# Patient Record
Sex: FEMALE | Race: WHITE | NOT HISPANIC OR LATINO | Employment: OTHER | URBAN - NONMETROPOLITAN AREA
[De-identification: names, ages, dates, MRNs, and addresses within clinical notes are randomized per-mention and may not be internally consistent; named-entity substitution may affect disease eponyms.]

---

## 2017-02-23 ENCOUNTER — OFFICE VISIT (OUTPATIENT)
Dept: MEDICAL GROUP | Facility: PHYSICIAN GROUP | Age: 62
End: 2017-02-23
Payer: COMMERCIAL

## 2017-02-23 VITALS
OXYGEN SATURATION: 97 % | BODY MASS INDEX: 29.09 KG/M2 | RESPIRATION RATE: 12 BRPM | DIASTOLIC BLOOD PRESSURE: 82 MMHG | SYSTOLIC BLOOD PRESSURE: 140 MMHG | TEMPERATURE: 98.8 F | HEIGHT: 66 IN | WEIGHT: 181 LBS | HEART RATE: 67 BPM

## 2017-02-23 DIAGNOSIS — E55.9 VITAMIN D DEFICIENCY: ICD-10-CM

## 2017-02-23 DIAGNOSIS — D68.9 CLOTTING DISORDER (HCC): ICD-10-CM

## 2017-02-23 DIAGNOSIS — D23.4 BLUE NEVUS OF SCALP: ICD-10-CM

## 2017-02-23 DIAGNOSIS — Z23 NEEDS FLU SHOT: ICD-10-CM

## 2017-02-23 DIAGNOSIS — E66.09 OBESITY DUE TO EXCESS CALORIES, UNSPECIFIED OBESITY SEVERITY: ICD-10-CM

## 2017-02-23 DIAGNOSIS — K21.00 GASTROESOPHAGEAL REFLUX DISEASE WITH ESOPHAGITIS: Chronic | ICD-10-CM

## 2017-02-23 DIAGNOSIS — F41.9 ANXIETY: ICD-10-CM

## 2017-02-23 PROCEDURE — 99214 OFFICE O/P EST MOD 30 MIN: CPT | Mod: 25 | Performed by: INTERNAL MEDICINE

## 2017-02-23 PROCEDURE — 90471 IMMUNIZATION ADMIN: CPT | Performed by: INTERNAL MEDICINE

## 2017-02-23 PROCEDURE — 90688 IIV4 VACCINE SPLT 0.5 ML IM: CPT | Performed by: INTERNAL MEDICINE

## 2017-02-23 RX ORDER — ESOMEPRAZOLE MAGNESIUM 40 MG/1
40 CAPSULE, DELAYED RELEASE ORAL
Qty: 90 CAP | Refills: 3 | Status: SHIPPED | OUTPATIENT
Start: 2017-02-23 | End: 2017-11-14

## 2017-02-23 RX ORDER — OMEPRAZOLE 20 MG/1
20 CAPSULE, DELAYED RELEASE ORAL DAILY
COMMUNITY
End: 2017-07-24

## 2017-02-23 ASSESSMENT — PAIN SCALES - GENERAL: PAINLEVEL: NO PAIN

## 2017-02-23 NOTE — ASSESSMENT & PLAN NOTE
Patient wonders if she is supposed to be taking asa.  She wonders if this has been present all her life.

## 2017-02-23 NOTE — MR AVS SNAPSHOT
"        Nell Fair   2017 11:40 AM   Office Visit   MRN: [id number]    Department:  [facility]   Dept Phone:  [phone]    Description:  Female : 1955   Provider:  Peyton NIELSON M.D.           Reason for Visit     Follow-Up stress       Allergies as of 2017     Allergen Noted Reactions    Ceclor [Cefaclor] 10/25/2011   Rash    Pcn [Penicillins] 10/25/2011   Rash    Protonix [Kdc:Ci Pigment Blue 63+Pantoprazole] 2016   Unspecified    dizziness    Sulfa Drugs 2014         You were diagnosed with     Anxiety   [696158]       Gastroesophageal reflux disease with esophagitis   [0434140]       Obesity due to excess calories, unspecified obesity severity   [8309802]       Vitamin D deficiency   [7449764]       Needs flu shot   [558020]       Blue nevus of scalp   [215315]       Clotting disorder (CMS-[facility])   [350983]         Vital Signs     Blood Pressure Pulse Temperature Respirations Height Weight    140/82 mmHg 67 37.1 °C (98.8 °F) 12 1.689 m (5' 6.5\") 82.101 kg (181 lb)    Body Mass Index Oxygen Saturation Smoking Status             28.78 kg/m2 97% Never Smoker          Basic Information     Date Of Birth Sex Race Ethnicity Preferred Language    1955 Female White Non- English      Problem List              ICD-10-CM Priority Class Noted - Resolved    GERD (gastroesophageal reflux disease) (Chronic) K21.9   2013 - Present    H/O radioactive iodine thyroid ablation (Chronic) Z92.3   2013 - Present    Obesity E66.9   2015 - Present    Vitamin D deficiency E55.9   2015 - Present    Clotting disorder (CMS-HCC) D68.9   2016 - Present    Blue nevus of scalp D22.4   2016 - Present    Anxiety F41.9   2016 - Present      Health Maintenance        Date Due Completion Dates    IMM DTaP/Tdap/Td Vaccine (1 - Tdap) 1974 ---    PAP SMEAR 1976 ---    IMM ZOSTER VACCINE 2015 ---    IMM INFLUENZA (1) 2016   " MAMMOGRAM 5/10/2017 5/10/2016 (Done)    Override on 5/10/2016: Done    COLONOSCOPY 3/1/2023 3/1/2013 (Done)    Override on 3/1/2013: Done            Current Immunizations     Influenza TIV (IM) 1/17/2014    Influenza Vaccine Quad Inj (Preserved)  Incomplete      Below and/or attached are the medications your provider expects you to take. Review all of your home medications and newly ordered medications with your provider and/or pharmacist. Follow medication instructions as directed by your provider and/or pharmacist. Please keep your medication list with you and share with your provider. Update the information when medications are discontinued, doses are changed, or new medications (including over-the-counter products) are added; and carry medication information at all times in the event of emergency situations     Allergies:  CECLOR - Rash     PCN - Rash     PROTONIX - Unspecified     SULFA DRUGS - (reactions not documented)               Medications  Valid as of: February 23, 2017 - 12:48 PM    Generic Name Brand Name Tablet Size Instructions for use    Aspirin (Tab) aspirin 81 MG Take 81 mg by mouth every day.        Calcium Citrate-Vitamin D   Take  by mouth.        Ergocalciferol (Cap) DRISDOL 77874 UNITS Take  by mouth every 7 days.        Esomeprazole Magnesium   Take  by mouth.        Esomeprazole Magnesium (CAPSULE DELAYED RELEASE) NEXIUM 40 MG Take 1 Cap by mouth every morning before breakfast.        Estradiol (Tab) VAGIFEM 10 MCG Insert 10 mcg in vagina every day.        LORazepam (Tab) ATIVAN 0.5 MG Take 1 Tab by mouth at bedtime as needed for Anxiety.        Nutritional Supplements   Take  by mouth.        Omeprazole (CAPSULE DELAYED RELEASE) PRILOSEC 20 MG Take 20 mg by mouth every day.        .                 Medicines prescribed today were sent to:     [facility] [place] - [address]    [address] [facility] [address]    Phone: [phone] Fax:  [phone]    Open 24 Hours?: No      Medication refill instructions:       If your prescription bottle indicates you have medication refills left, it is not necessary to call your provider’s office. Please contact your pharmacy and they will refill your medication.    If your prescription bottle indicates you do not have any refills left, you may request refills at any time through one of the following ways: The online Acision system (except Urgent Care), by calling your provider’s office, or by asking your pharmacy to contact your provider’s office with a refill request. Medication refills are processed only during regular business hours and may not be available until the next business day. Your provider may request additional information or to have a follow-up visit with you prior to refilling your medication.   *Please Note: Medication refills are assigned a new Rx number when refilled electronically. Your pharmacy may indicate that no refills were authorized even though a new prescription for the same medication is available at the pharmacy. Please request the medicine by name with the pharmacy before contacting your provider for a refill.           Acision Access Code: [email or web address]  Expires: 3/25/2017 11:26 AM    Acision  A secure, online tool to manage your health information     [employer]’s Acision® is a secure, online tool that connects you to your personalized health information from the privacy of your home -- day or night - making it very easy for you to manage your healthcare. Once the activation process is completed, you can even access your medical information using the Acision sara, which is available for free in the Apple Sara store or Google Play store.     Acision provides the following levels of access (as shown below):   My Chart Features   [facility] Doctor [facility]  Specialists [facility]  Urgent  Care Non-[facility]  Primary Care  Doctor   Email your healthcare team securely and  privately 24/7 X X X    Manage appointments: schedule your next appointment; view details of past/upcoming appointments X      Request prescription refills. X      View recent personal medical records, including lab and immunizations X X X X   View health record, including health history, allergies, medications X X X X   Read reports about your outpatient visits, procedures, consult and ER notes X X X X   See your discharge summary, which is a recap of your hospital and/or ER visit that includes your diagnosis, lab results, and care plan. X X       How to register for Purple Blue Bo:  1. Go to  [email or web address].  2. Click on the Sign Up Now box, which takes you to the New Member Sign Up page. You will need to provide the following information:  a. Enter your Purple Blue Bo Access Code exactly as it appears at the top of this page. (You will not need to use this code after you’ve completed the sign-up process. If you do not sign up before the expiration date, you must request a new code.)   b. Enter your date of birth.   c. Enter your home email address.   d. Click Submit, and follow the next screen’s instructions.  3. Create a Purple Blue Bo ID. This will be your Purple Blue Bo login ID and cannot be changed, so think of one that is secure and easy to remember.  4. Create a Purple Blue Bo password. You can change your password at any time.  5. Enter your Password Reset Question and Answer. This can be used at a later time if you forget your password.   6. Enter your e-mail address. This allows you to receive e-mail notifications when new information is available in Purple Blue Bo.  7. Click Sign Up. You can now view your health information.    For assistance activating your Purple Blue Bo account, call [phone]

## 2017-02-23 NOTE — ASSESSMENT & PLAN NOTE
continues with issues with cancer.  She is having gi symptoms.  She is using dotera oils, she ha used the lorazepam about 3 times.   with 6-9 mos life expectation.

## 2017-02-23 NOTE — ASSESSMENT & PLAN NOTE
Patient continues with gi symptoms, she went off the nexium for awhile, more stress at home, restarted Nexium and Carafate and symptoms are better.  $ issues with the

## 2017-02-27 NOTE — PROGRESS NOTES
Chief Complaint   Patient presents with   • Follow-Up     stress        HISTORY OF PRESENT ILLNESS: Patient is a 61 y.o. female established patient who presents today to discuss the medical issues below.    Anxiety   continues with issues with cancer.  She is having gi symptoms.  She is using dotera oils, she ha used the lorazepam about 3 times.   with 6-9 mos life expectation.      GERD (gastroesophageal reflux disease)  Patient continues with gi symptoms, she went off the nexium for awhile, more stress at home, restarted Nexium and Carafate and symptoms are better.  $ issues with the     Obesity  In no position to worry about diet at this time.      Vitamin D deficiency  On supplementation.      Clotting disorder (CMS-HCC)  Patient wonders if she is supposed to be taking asa.  She wonders if this has been present all her life.  She reports she has read about some danger to ASA however, has continued it currently.  No bruising, bloody nose, easy bleeding.       Patient Active Problem List    Diagnosis Date Noted   • Blue nevus of scalp 12/13/2016   • Anxiety 12/13/2016   • Clotting disorder (CMS-[facility]) 02/23/2016   • Obesity 11/20/2015   • Vitamin D deficiency 11/20/2015   • GERD (gastroesophageal reflux disease) 02/05/2013   • H/O radioactive iodine thyroid ablation 02/05/2013       Allergies:Ceclor; Pcn; Protonix; and Sulfa drugs    Current Outpatient Prescriptions   Medication Sig Dispense Refill   • omeprazole (PRILOSEC) 20 MG delayed-release capsule Take 20 mg by mouth every day.     • esomeprazole (NEXIUM) 40 MG delayed-release capsule Take 1 Cap by mouth every morning before breakfast. 90 Cap 3   • lorazepam (ATIVAN) 0.5 MG Tab Take 1 Tab by mouth at bedtime as needed for Anxiety. 20 Tab 0   • Calcium Carbonate-Vitamin D (CALCIUM + D PO) Take  by mouth.     • Nutritional Supplements (JUICE PLUS FIBRE PO) Take  by mouth.     • vitamin D, Ergocalciferol, (DRISDOL) 96891 UNITS Cap capsule Take  by  "mouth every 7 days.     • estradiol (VAGIFEM) 10 MCG Tab Insert 10 mcg in vagina every day.     • aspirin 81 MG tablet Take 81 mg by mouth every day.     • Esomeprazole Magnesium (NEXIUM PO) Take  by mouth.       No current facility-administered medications for this visit.         Past Medical History   Diagnosis Date   • Radiation      tx- thyroid   • GERD (gastroesophageal reflux disease) 2/5/2013   • H/O radioactive iodine thyroid ablation 2/5/2013   • Obesity 11/20/2015   • Vitamin D deficiency 11/20/2015   • Clotting disorder (CMS-HCC) 2/23/2016   • Anxiety 12/13/2016       Social History   Substance Use Topics   • Smoking status: Never Smoker    • Smokeless tobacco: Never Used   • Alcohol Use: 4.2 oz/week     7 Standard drinks or equivalent per week       Family Status   Relation Status Death Age   • Mother Alive    • Father Alive    • Brother Alive    • Daughter Alive      Family History   Problem Relation Age of Onset   • Diabetes Mother    • Other Mother      macular degeneration   • Thyroid Mother    • Psychiatry Mother      mood disorder   • Heart Disease Mother      pacemaker   • Osteoporosis Mother    • Hyperlipidemia Father    • Heart Disease Father      pacemaker   • Cancer Father      prostate   • Hyperlipidemia Brother    • Cancer Brother      prostate       ROS:   Respiratory: Negative for cough, sputum production, shortness of breath or wheezing.    Cardiovascular: Negative for chest pain, palpitations, orthopnea, dyspnea with exertion or edema.   Gastrointestinal: Negative for GI upset, nausea, vomiting, abdominal pain, constipation or diarrhea.   Genitourinary: Negative for dysuria, urgency, hesitancy or frequency.       Exam:   Blood pressure 140/82, pulse 67, temperature 37.1 °C (98.8 °F), resp. rate 12, height 1.689 m (5' 6.5\"), weight 82.101 kg (181 lb), SpO2 97 %.  General:  Well nourished, well developed female in NAD.  HENT: Normocephalic, bilateral TMs are intact, nasal and oral mucosa " with no lesions,   Neck: Supple without bruit. Thyroid is not enlarged.  Pulmonary: Clear to ausculation and percussion.  Normal effort. No rales, rhonchi, or wheezing.  Cardiovascular: Regular rate and rhythm without murmur.   Abdomen: Normal bowel sounds soft and nontender no palpable liver spleen bladder mass.  Extremities: No LE edema noted.  Neuro: Grossly nonfocal.  Psych: Alert and oriented to person, place, and time. Appropriate mood and conversation.        This dictation was created using voice recognition software. I have made reasonable attempts to correct errors, however, errors of grammar and content may exist.          Assessment/Plan:    1. Anxiety  Primary issue for today, long discussion and support given. Options for counseling, she declines, continues prn xanax with no evidence of abuse and appropriate reaction.     2. Gastroesophageal reflux disease with esophagitis  Stable on meds    3. Obesity due to excess calories, unspecified obesity severity  Support, high stress time, reviewed avoid carbs focus on protein for stress managemnt    4. Vitamin D deficiency  Stable on meds, lab montioring    5. Needs flu shot  - Flu Quad Inj>3 Years Preserved Multi-Dose Vial    6. Clotting disorder (CMS-[facility])  Recommend continue asa as she has documented clotting disorder, clarified for patient.       Patient was seen for 35 minutes face to face of which more than 50% of the time was spent in counseling and coordination of care regarding the above problems.

## 2017-07-24 ENCOUNTER — OFFICE VISIT (OUTPATIENT)
Dept: URGENT CARE | Facility: PHYSICIAN GROUP | Age: 62
End: 2017-07-24
Payer: COMMERCIAL

## 2017-07-24 VITALS
OXYGEN SATURATION: 96 % | TEMPERATURE: 99.2 F | RESPIRATION RATE: 16 BRPM | SYSTOLIC BLOOD PRESSURE: 142 MMHG | BODY MASS INDEX: 29.41 KG/M2 | WEIGHT: 183 LBS | DIASTOLIC BLOOD PRESSURE: 82 MMHG | HEART RATE: 82 BPM | HEIGHT: 66 IN

## 2017-07-24 DIAGNOSIS — J40 BRONCHITIS: ICD-10-CM

## 2017-07-24 PROCEDURE — 99214 OFFICE O/P EST MOD 30 MIN: CPT | Performed by: PHYSICIAN ASSISTANT

## 2017-07-24 RX ORDER — CODEINE PHOSPHATE AND GUAIFENESIN 10; 100 MG/5ML; MG/5ML
5 SOLUTION ORAL EVERY 6 HOURS PRN
Qty: 100 ML | Refills: 0 | Status: SHIPPED | OUTPATIENT
Start: 2017-07-24 | End: 2018-01-19

## 2017-07-24 RX ORDER — IPRATROPIUM BROMIDE AND ALBUTEROL SULFATE 2.5; .5 MG/3ML; MG/3ML
3 SOLUTION RESPIRATORY (INHALATION) ONCE
Status: COMPLETED | OUTPATIENT
Start: 2017-07-24 | End: 2017-07-24

## 2017-07-24 RX ORDER — CODEINE PHOSPHATE AND GUAIFENESIN 10; 100 MG/5ML; MG/5ML
5 SOLUTION ORAL EVERY 6 HOURS PRN
Qty: 100 ML | Refills: 0 | Status: SHIPPED | OUTPATIENT
Start: 2017-07-24 | End: 2017-07-24 | Stop reason: SDUPTHER

## 2017-07-24 RX ORDER — ALBUTEROL SULFATE 90 UG/1
2 AEROSOL, METERED RESPIRATORY (INHALATION) EVERY 6 HOURS PRN
Qty: 8.5 G | Refills: 1 | Status: SHIPPED | OUTPATIENT
Start: 2017-07-24 | End: 2018-01-19

## 2017-07-24 RX ADMIN — IPRATROPIUM BROMIDE AND ALBUTEROL SULFATE 3 ML: 2.5; .5 SOLUTION RESPIRATORY (INHALATION) at 15:23

## 2017-07-24 ASSESSMENT — ENCOUNTER SYMPTOMS
SPUTUM PRODUCTION: 0
COUGH: 1
SHORTNESS OF BREATH: 1
RHINORRHEA: 1
HEMOPTYSIS: 0
CHILLS: 0
SORE THROAT: 1
WHEEZING: 0
FEVER: 0

## 2017-07-24 NOTE — MR AVS SNAPSHOT
"        Nell Fair   2017 3:05 PM   Office Visit   MRN: [id number]    Department:  [facility]   Dept Phone:  [phone]    Description:  Female : 1955   Provider:  SOFIA Chavez           Reason for Visit     Cough x5days chest tightness      Allergies as of 2017     Allergen Noted Reactions    Ceclor [Cefaclor] 10/25/2011   Rash    Pcn [Penicillins] 10/25/2011   Rash    Protonix [Kdc:Ci Pigment Blue 63+Pantoprazole] 2016   Unspecified    dizziness    Sulfa Drugs 2014         You were diagnosed with     Bronchitis   [[id number]]         Vital Signs     Blood Pressure Pulse Temperature Respirations Height Weight    142/82 mmHg 82 37.3 °C (99.2 °F) 16 1.689 m (5' 6.5\") 83.008 kg (183 lb)    Body Mass Index Oxygen Saturation Breastfeeding? Smoking Status          29.10 kg/m2 96% No Never Smoker         Basic Information     Date Of Birth Sex Race Ethnicity Preferred Language    1955 Female White Non- English      Your appointments     Sep 13, 2017  9:00 AM   Established Patient with Peyton NIELSON M.D.   [facility] (--)    [address] [address] [phone]           You will be receiving a confirmation call a few days before your appointment from our automated call confirmation system.              Problem List              ICD-10-CM Priority Class Noted - Resolved    GERD (gastroesophageal reflux disease) (Chronic) K21.9   2013 - Present    H/O radioactive iodine thyroid ablation (Chronic) Z92.3   2013 - Present    Obesity E66.9   2015 - Present    Vitamin D deficiency E55.9   2015 - Present    Clotting disorder (CMS-HCC) D68.9   2016 - Present    Blue nevus of scalp D22.4   2016 - Present    Anxiety F41.9   2016 - Present      Health Maintenance        Date Due Completion Dates    IMM DTaP/Tdap/Td Vaccine (1 - Tdap) 1974 ---    PAP SMEAR 1976 ---    IMM ZOSTER " VACCINE 7/27/2015 ---    MAMMOGRAM 5/10/2017 5/10/2016 (Done)    Override on 5/10/2016: Done    IMM INFLUENZA (1) 9/1/2017 2/23/2017, 1/17/2014    COLONOSCOPY 3/1/2023 3/1/2013 (Done)    Override on 3/1/2013: Done            Current Immunizations     Influenza TIV (IM) 1/17/2014    Influenza Vaccine Quad Inj (Preserved) 2/23/2017      Below and/or attached are the medications your provider expects you to take. Review all of your home medications and newly ordered medications with your provider and/or pharmacist. Follow medication instructions as directed by your provider and/or pharmacist. Please keep your medication list with you and share with your provider. Update the information when medications are discontinued, doses are changed, or new medications (including over-the-counter products) are added; and carry medication information at all times in the event of emergency situations     Allergies:  CECLOR - Rash     PCN - Rash     PROTONIX - Unspecified     SULFA DRUGS - (reactions not documented)               Medications  Valid as of: July 24, 2017 -  3:32 PM    Generic Name Brand Name Tablet Size Instructions for use    Albuterol Sulfate (Aero Soln) albuterol 108 (90 BASE) MCG/ACT Inhale 2 Puffs by mouth every 6 hours as needed for Shortness of Breath.        Aspirin (Tab) aspirin 81 MG Take 81 mg by mouth every day.        Calcium Citrate-Vitamin D   Take  by mouth.        Ergocalciferol (Cap) DRISDOL 94405 UNITS Take  by mouth every 7 days.        Esomeprazole Magnesium (CAPSULE DELAYED RELEASE) NEXIUM 40 MG Take 1 Cap by mouth every morning before breakfast.        Estradiol (Tab) VAGIFEM 10 MCG Insert 10 mcg in vagina every day.        Guaifenesin-Codeine (Solution) ROBITUSSIN -10 mg/5mL Take 5 mL by mouth every 6 hours as needed for Cough.        Nutritional Supplements   Take  by mouth.        .                 Medicines prescribed today were sent to:     [facility] [place] - [address]    [address] [place] [address]    Phone: [phone] Fax: [phone]    Open 24 Hours?: No      Medication refill instructions:       If your prescription bottle indicates you have medication refills left, it is not necessary to call your provider’s office. Please contact your pharmacy and they will refill your medication.    If your prescription bottle indicates you do not have any refills left, you may request refills at any time through one of the following ways: The online [employer] system (except Urgent Care), by calling your provider’s office, or by asking your pharmacy to contact your provider’s office with a refill request. Medication refills are processed only during regular business hours and may not be available until the next business day. Your provider may request additional information or to have a follow-up visit with you prior to refilling your medication.   *Please Note: Medication refills are assigned a new Rx number when refilled electronically. Your pharmacy may indicate that no refills were authorized even though a new prescription for the same medication is available at the pharmacy. Please request the medicine by name with the pharmacy before contacting your provider for a refill.        Instructions    Bronchitis  Bronchitis is the body's way of reacting to injury and/or infection (inflammation) of the bronchi. Bronchi are the air tubes that extend from the windpipe into the lungs. If the inflammation becomes severe, it may cause shortness of breath.  CAUSES   Inflammation may be caused by:  · A virus.  · Germs (bacteria).  · Dust.  · Allergens.  · Pollutants and many other irritants.  The cells lining the bronchial tree are covered with tiny hairs (cilia). These constantly beat upward, away from the lungs, toward the mouth. This keeps the lungs free of pollutants. When these cells become too irritated and are unable to do their job, mucus begins to  develop. This causes the characteristic cough of bronchitis. The cough clears the lungs when the cilia are unable to do their job. Without either of these protective mechanisms, the mucus would settle in the lungs. Then you would develop pneumonia.  Smoking is a common cause of bronchitis and can contribute to pneumonia. Stopping this habit is the single most important thing you can do to help yourself.  TREATMENT   · Your caregiver may prescribe an antibiotic if the cough is caused by bacteria. Also, medicines that open up your airways make it easier to breathe. Your caregiver may also recommend or prescribe an expectorant. It will loosen the mucus to be coughed up. Only take over-the-counter or prescription medicines for pain, discomfort, or fever as directed by your caregiver.  · Removing whatever causes the problem (smoking, for example) is critical to preventing the problem from getting worse.  · Cough suppressants may be prescribed for relief of cough symptoms.  · Inhaled medicines may be prescribed to help with symptoms now and to help prevent problems from returning.  · For those with recurrent (chronic) bronchitis, there may be a need for steroid medicines.  SEEK IMMEDIATE MEDICAL CARE IF:   · During treatment, you develop more pus-like mucus (purulent sputum).  · You have a fever.  · Your baby is older than 3 months with a rectal temperature of 102° F (38.9° C) or higher.  · Your baby is 3 months old or younger with a rectal temperature of 100.4° F (38° C) or higher.  · You become progressively more ill.  · You have increased difficulty breathing, wheezing, or shortness of breath.  It is necessary to seek immediate medical care if you are elderly or sick from any other disease.  MAKE SURE YOU:   · Understand these instructions.  · Will watch your condition.  · Will get help right away if you are not doing well or get worse.  Document Released: 12/18/2006 Document Revised: 03/11/2013 Document Reviewed:  10/27/2009  ExitCare® Patient Information ©2014 [employer].            [employer] Access Code: [email or web address]  Expires: 8/23/2017  3:32 PM    [employer]  A secure, online tool to manage your health information     [employer]’s [employer]® is a secure, online tool that connects you to your personalized health information from the privacy of your home -- day or night - making it very easy for you to manage your healthcare. Once the activation process is completed, you can even access your medical information using the [employer] sara, which is available for free in the Apple Sara store or Google Play store.     [employer] provides the following levels of access (as shown below):   My Chart Features   [facility] Doctor [facility]  Specialists [facility]  Urgent  Care Non-[facility]  Primary Care  Doctor   Email your healthcare team securely and privately 24/7 X X X    Manage appointments: schedule your next appointment; view details of past/upcoming appointments X      Request prescription refills. X      View recent personal medical records, including lab and immunizations X X X X   View health record, including health history, allergies, medications X X X X   Read reports about your outpatient visits, procedures, consult and ER notes X X X X   See your discharge summary, which is a recap of your hospital and/or ER visit that includes your diagnosis, lab results, and care plan. X X       How to register for [employer]:  1. Go to  [email or web address].  2. Click on the Sign Up Now box, which takes you to the New Member Sign Up page. You will need to provide the following information:  a. Enter your [employer] Access Code exactly as it appears at the top of this page. (You will not need to use this code after you’ve completed the sign-up process. If you do not sign up before the expiration date, you must request a new code.)   b. Enter your date of birth.   c. Enter your home email address.   d. Click Submit, and follow the next  screen’s instructions.  3. Create a Play[employer] ID. This will be your Play[employer] login ID and cannot be changed, so think of one that is secure and easy to remember.  4. Create a [employer] password. You can change your password at any time.  5. Enter your Password Reset Question and Answer. This can be used at a later time if you forget your password.   6. Enter your e-mail address. This allows you to receive e-mail notifications when new information is available in [employer].  7. Click Sign Up. You can now view your health information.    For assistance activating your [employer] account, call [phone]

## 2017-07-24 NOTE — PATIENT INSTRUCTIONS

## 2017-07-24 NOTE — PROGRESS NOTES
Subjective:      Nell Fair is a 61 y.o. female who presents with Cough            HPI Comments: Patient presents today with 5 day history of cough and chest tightness with occasional shortness of breath. Symptoms seem to be worse at night and when moving around. Reports significant difficulty sleeping last night due to very frequent coughing. She would like a cough suppressant as the OTC cough medicine does not seem to be helping very much. No measured fevers. No sputum.    Cough  This is a new problem. The current episode started in the past 7 days. The problem has been waxing and waning. The cough is non-productive. Associated symptoms include nasal congestion, rhinorrhea, a sore throat and shortness of breath. Pertinent negatives include no chills, fever, hemoptysis or wheezing. Exacerbated by: activity. She has tried OTC cough suppressant for the symptoms. The treatment provided mild relief.       Review of Systems   Constitutional: Negative for fever and chills.   HENT: Positive for congestion, rhinorrhea and sore throat.    Respiratory: Positive for cough and shortness of breath. Negative for hemoptysis, sputum production and wheezing.      Allergies:Ceclor; Pcn; Protonix; and Sulfa drugs    Current Outpatient Prescriptions Ordered in [facility]   Medication Sig Dispense Refill   • guaifenesin-codeine (ROBITUSSIN AC) Solution oral solution Take 5 mL by mouth every 6 hours as needed for Cough. 100 mL 0   • albuterol 108 (90 BASE) MCG/ACT Aero Soln inhalation aerosol Inhale 2 Puffs by mouth every 6 hours as needed for Shortness of Breath. 8.5 g 1   • esomeprazole (NEXIUM) 40 MG delayed-release capsule Take 1 Cap by mouth every morning before breakfast. 90 Cap 3   • Calcium Carbonate-Vitamin D (CALCIUM + D PO) Take  by mouth.     • Nutritional Supplements (JUICE PLUS FIBRE PO) Take  by mouth.     • vitamin D, Ergocalciferol, (DRISDOL) 69296 UNITS Cap capsule Take  by mouth every 7 days.     • estradiol  "(VAGIFEM) 10 MCG Tab Insert 10 mcg in vagina every day.     • aspirin 81 MG tablet Take 81 mg by mouth every day.       No current [facility]-ordered facility-administered medications on file.       Past Medical History   Diagnosis Date   • Radiation      tx- thyroid   • GERD (gastroesophageal reflux disease) 2/5/2013   • H/O radioactive iodine thyroid ablation 2/5/2013   • Obesity 11/20/2015   • Vitamin D deficiency 11/20/2015   • Clotting disorder (CMS-HCC) 2/23/2016   • Anxiety 12/13/2016       Social History   Substance Use Topics   • Smoking status: Never Smoker    • Smokeless tobacco: Never Used   • Alcohol Use: 4.2 oz/week     7 Standard drinks or equivalent per week      Comment: soc       Family Status   Relation Status Death Age   • Mother Alive    • Father Alive    • Brother Alive    • Daughter Alive      Family History   Problem Relation Age of Onset   • Diabetes Mother    • Other Mother      macular degeneration   • Thyroid Mother    • Psychiatry Mother      mood disorder   • Heart Disease Mother      pacemaker   • Osteoporosis Mother    • Hyperlipidemia Father    • Heart Disease Father      pacemaker   • Cancer Father      prostate   • Hyperlipidemia Brother    • Cancer Brother      prostate         Objective:     /82 mmHg  Pulse 82  Temp(Src) 37.3 °C (99.2 °F)  Resp 16  Ht 1.689 m (5' 6.5\")  Wt 83.008 kg (183 lb)  BMI 29.10 kg/m2  SpO2 96%  Breastfeeding? No     Physical Exam   Constitutional: She is oriented to person, place, and time. She appears well-developed and well-nourished. No distress.   HENT:   Head: Normocephalic and atraumatic.   Right Ear: External ear normal.   Left Ear: External ear normal.   Mouth/Throat: Oropharynx is clear and moist.   Eyes: Right eye exhibits no discharge. Left eye exhibits no discharge.   Neck: Normal range of motion. Neck supple.   Cardiovascular: Normal rate and regular rhythm.    Pulmonary/Chest: Effort normal and breath sounds normal. She has no " wheezes. She has no rales.   Neurological: She is alert and oriented to person, place, and time.   Skin: Skin is warm and dry. She is not diaphoretic.   Psychiatric: She has a normal mood and affect. Her behavior is normal. Judgment and thought content normal.   Nursing note and vitals reviewed.              Assessment/Plan:     1. Bronchitis  ipratropium-albuterol (DUONEB) nebulizer solution 3 mL    guaifenesin-codeine (ROBITUSSIN AC) Solution oral solution    albuterol 108 (90 BASE) MCG/ACT Aero Soln inhalation aerosol    DISCONTINUED: guaifenesin-codeine (ROBITUSSIN AC) Solution oral solution    Fluctuating for 5 days. No sputum. Improvement in chest tightness after DuoNeb. Start albuterol and Robitussin-AC. Follow-up with PCP       [employer] Patient Education given: Bronchitis    Please note that this dictation was created using voice recognition software. I have made every reasonable attempt to correct obvious errors, but I expect that there are errors of grammar and possibly content that I did not discover before finalizing the note.

## 2017-09-13 ENCOUNTER — OFFICE VISIT (OUTPATIENT)
Dept: MEDICAL GROUP | Facility: PHYSICIAN GROUP | Age: 62
End: 2017-09-13
Payer: COMMERCIAL

## 2017-09-13 VITALS
BODY MASS INDEX: 30.32 KG/M2 | DIASTOLIC BLOOD PRESSURE: 90 MMHG | RESPIRATION RATE: 16 BRPM | HEIGHT: 65 IN | SYSTOLIC BLOOD PRESSURE: 130 MMHG | WEIGHT: 182 LBS | HEART RATE: 68 BPM | OXYGEN SATURATION: 99 % | TEMPERATURE: 97.9 F

## 2017-09-13 DIAGNOSIS — F41.9 ANXIETY: ICD-10-CM

## 2017-09-13 DIAGNOSIS — E66.09 OBESITY DUE TO EXCESS CALORIES, UNSPECIFIED OBESITY SEVERITY: ICD-10-CM

## 2017-09-13 DIAGNOSIS — E55.9 VITAMIN D DEFICIENCY: ICD-10-CM

## 2017-09-13 DIAGNOSIS — K21.00 GASTROESOPHAGEAL REFLUX DISEASE WITH ESOPHAGITIS: Chronic | ICD-10-CM

## 2017-09-13 PROCEDURE — 99214 OFFICE O/P EST MOD 30 MIN: CPT | Performed by: INTERNAL MEDICINE

## 2017-09-13 RX ORDER — SUCRALFATE 1 G/1
1 TABLET ORAL
Qty: 120 TAB | Refills: 3 | Status: SHIPPED | OUTPATIENT
Start: 2017-09-13 | End: 2019-03-27

## 2017-09-13 RX ORDER — OMEPRAZOLE 20 MG/1
20 CAPSULE, DELAYED RELEASE ORAL DAILY
Qty: 90 CAP | Refills: 3 | Status: SHIPPED | OUTPATIENT
Start: 2017-09-13 | End: 2017-11-14

## 2017-09-13 ASSESSMENT — PAIN SCALES - GENERAL: PAINLEVEL: NO PAIN

## 2017-09-13 NOTE — ASSESSMENT & PLAN NOTE
Patient reports  did pass away.  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    about 2 mos ago.

## 2017-09-14 NOTE — PROGRESS NOTES
Chief Complaint   Patient presents with   • Follow-Up     stress        HISTORY OF PRESENT ILLNESS: Patient is a 62 y.o. female established patient who presents today to discuss the medical issues below.    Anxiety  Patient reports  did pass away.  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    about 2 mos ago.  Weight is good she sleeping well, starting to exercise.      Patient Active Problem List    Diagnosis Date Noted   • Blue nevus of scalp 2016   • Anxiety 2016   • Clotting disorder (CMS-HCC) 2016   • Obesity 2015   • Vitamin D deficiency 2015   • GERD (gastroesophageal reflux disease) 2013   • H/O radioactive iodine thyroid ablation 2013       Allergies:Ceclor [cefaclor]; Pcn [penicillins]; Protonix [kdc:ci pigment blue 63+pantoprazole]; and Sulfa drugs    Current Outpatient Prescriptions   Medication Sig Dispense Refill   • sucralfate (CARAFATE) 1 GM Tab Take 1 Tab by mouth 4 Times a Day,Before Meals and at Bedtime. 120 Tab 3   • omeprazole (PRILOSEC) 20 MG delayed-release capsule Take 1 Cap by mouth every day. 90 Cap 3   • esomeprazole (NEXIUM) 40 MG delayed-release capsule Take 1 Cap by mouth every morning before breakfast. 90 Cap 3   • Calcium Carbonate-Vitamin D (CALCIUM + D PO) Take  by mouth.     • Nutritional Supplements (JUICE PLUS FIBRE PO) Take  by mouth.     • vitamin D, Ergocalciferol, (DRISDOL) 95801 UNITS Cap capsule Take 4,000 Units by mouth every 7 days.     • estradiol (VAGIFEM) 10 MCG Tab Insert 10 mcg in vagina every day.     • aspirin 81 MG tablet Take 81 mg by mouth every day.     • guaifenesin-codeine (ROBITUSSIN AC) Solution oral solution Take 5 mL by mouth every 6 hours as needed for Cough. (Patient not taking: Reported on 2017) 100 mL 0   • albuterol 108 (90 BASE) MCG/ACT Aero Soln inhalation aerosol Inhale 2 Puffs by mouth every 6 hours as needed for Shortness of Breath. (Patient not taking: Reported on  "9/13/2017) 8.5 g 1     No current facility-administered medications for this visit.          Past Medical History:   Diagnosis Date   • Anxiety 12/13/2016   • Clotting disorder (CMS-HCC) 2/23/2016   • Obesity 11/20/2015   • Vitamin D deficiency 11/20/2015   • GERD (gastroesophageal reflux disease) 2/5/2013   • H/O radioactive iodine thyroid ablation 2/5/2013   • Radiation     tx- thyroid       Social History   Substance Use Topics   • Smoking status: Never Smoker   • Smokeless tobacco: Never Used   • Alcohol use 4.2 oz/week     7 Standard drinks or equivalent per week      Comment: soc       Family Status   Relation Status   • Mother Alive   • Father Alive   • Brother Alive   • Daughter Alive     Family History   Problem Relation Age of Onset   • Diabetes Mother    • Other Mother      macular degeneration   • Thyroid Mother    • Psychiatry Mother      mood disorder   • Heart Disease Mother      pacemaker   • Osteoporosis Mother    • Hyperlipidemia Father    • Heart Disease Father      pacemaker   • Cancer Father      prostate   • Hyperlipidemia Brother    • Cancer Brother      prostate       ROS:    Respiratory: Negative for cough, sputum production, shortness of breath or wheezing.    Cardiovascular: Negative for chest pain, palpitations, orthopnea, dyspnea with exertion or edema.   Gastrointestinal: Negative for GI upset, nausea, vomiting, abdominal pain, constipation or diarrhea.   Genitourinary: Negative for dysuria, urgency, hesitancy or frequency.       Exam:    Blood pressure 130/90, pulse 68, temperature 36.6 °C (97.9 °F), resp. rate 16, height 1.651 m (5' 5\"), weight 82.6 kg (182 lb), SpO2 99 %.  General:  Well nourished, well developed female in NAD.  HENT: Normocephalic, bilateral TMs are intact, nasal and oral mucosa with no lesions,   Neck: Supple without bruit. Thyroid is not enlarged.  Pulmonary: Clear to ausculation and percussion.  Normal effort. No rales, rhonchi, or wheezing.  Cardiovascular: " Regular rate and rhythm without murmur.   Abdomen: Normal bowel sounds soft and nontender no palpable liver spleen bladder mass.  Extremities: No LE edema noted.  Neuro: Grossly nonfocal.  Psych: Alert and oriented to person, place, and time. Appropriate mood and conversation.        This dictation was created using voice recognition software. I have made reasonable attempts to correct errors, however, errors of grammar and content may exist.          Assessment/Plan:    1. Anxiety  Doing well discussed options support clinical counseling referral made  - REFERRAL TO BEHAVIORAL HEALTH    2. Gastroesophageal reflux disease with esophagitis  Patient needs a refill on her Carafate she is doing well with only when necessary use ongoing monitoring consideration for repeat endoscopy if persisting  - sucralfate (CARAFATE) 1 GM Tab; Take 1 Tab by mouth 4 Times a Day,Before Meals and at Bedtime.  Dispense: 120 Tab; Refill: 3  - COMP METABOLIC PANEL; Future  - CBC WITH DIFFERENTIAL; Future    3. Vitamin D deficiency  Discussed vitamin D monitoring  - VITAMIN D,25 HYDROXY; Future    4. Obesity due to excess calories, unspecified obesity severity  Discussed diet exercise weight loss ongoing lab monitoring  - LIPID PROFILE; Future       Patient was seen for  25 minutes face to face of which more than 50% of the time was spent in counseling and coordination of care regarding the above problems.

## 2017-10-09 NOTE — TELEPHONE ENCOUNTER
Was the patient seen in the last year in this department? Yes  Last OV 09/13/2017 Next OV 12/28/2017    Does patient have an active prescription for medications requested? Yes     Received Request Via: Pharmacy     Patient was put on esomeprazole and she has been taking it for 3 weeks and it is not working for her like the [facility] did. She would like to go back to the [facility].

## 2017-10-10 RX ORDER — OMEPRAZOLE 20 MG/1
40 CAPSULE, DELAYED RELEASE ORAL DAILY
Qty: 90 CAP | OUTPATIENT
Start: 2017-10-10

## 2017-11-14 ENCOUNTER — TELEPHONE (OUTPATIENT)
Dept: MEDICAL GROUP | Facility: PHYSICIAN GROUP | Age: 62
End: 2017-11-14

## 2017-11-14 RX ORDER — OMEPRAZOLE 40 MG/1
40 CAPSULE, DELAYED RELEASE ORAL DAILY
Qty: 90 CAP | Refills: 0 | Status: SHIPPED | OUTPATIENT
Start: 2017-11-14 | End: 2018-02-07 | Stop reason: SDUPTHER

## 2017-11-14 NOTE — TELEPHONE ENCOUNTER
1. Caller Name: paulina                      Call Back Number: [phone]    2. Message: pt called and stated that she no longer wants to use esomeprazole because it is more expensive than omeprazole.  She wants to go with omeprazole 40 mgs.  An Rx was sent for omeprazole 20 mgs in September, but the patient said that she has always taken 40 mgs.  Can the RX be changed?  Please advise     3. Patient approves office to leave a detailed voicemail/MyChart message: N\A

## 2018-01-19 ENCOUNTER — OFFICE VISIT (OUTPATIENT)
Dept: MEDICAL GROUP | Facility: PHYSICIAN GROUP | Age: 63
End: 2018-01-19
Payer: OTHER MISCELLANEOUS

## 2018-01-19 VITALS
HEIGHT: 65 IN | WEIGHT: 189 LBS | DIASTOLIC BLOOD PRESSURE: 82 MMHG | RESPIRATION RATE: 16 BRPM | HEART RATE: 72 BPM | BODY MASS INDEX: 31.49 KG/M2 | SYSTOLIC BLOOD PRESSURE: 132 MMHG | TEMPERATURE: 98.9 F | OXYGEN SATURATION: 99 %

## 2018-01-19 DIAGNOSIS — E78.5 HYPERLIPIDEMIA, UNSPECIFIED HYPERLIPIDEMIA TYPE: ICD-10-CM

## 2018-01-19 DIAGNOSIS — F41.9 ANXIETY: ICD-10-CM

## 2018-01-19 DIAGNOSIS — Z23 NEEDS FLU SHOT: ICD-10-CM

## 2018-01-19 DIAGNOSIS — E55.9 VITAMIN D DEFICIENCY: ICD-10-CM

## 2018-01-19 DIAGNOSIS — Z92.3 H/O RADIOACTIVE IODINE THYROID ABLATION: Chronic | ICD-10-CM

## 2018-01-19 DIAGNOSIS — E66.09 CLASS 1 OBESITY DUE TO EXCESS CALORIES WITHOUT SERIOUS COMORBIDITY WITH BODY MASS INDEX (BMI) OF 30.0 TO 30.9 IN ADULT: ICD-10-CM

## 2018-01-19 PROCEDURE — 90471 IMMUNIZATION ADMIN: CPT | Performed by: INTERNAL MEDICINE

## 2018-01-19 PROCEDURE — 99214 OFFICE O/P EST MOD 30 MIN: CPT | Mod: 25 | Performed by: INTERNAL MEDICINE

## 2018-01-19 PROCEDURE — 90686 IIV4 VACC NO PRSV 0.5 ML IM: CPT | Performed by: INTERNAL MEDICINE

## 2018-01-19 ASSESSMENT — PAIN SCALES - GENERAL: PAINLEVEL: NO PAIN

## 2018-01-19 NOTE — PROGRESS NOTES
Chief Complaint   Patient presents with   • Vitamin D Deficiency     lab results        HISTORY OF PRESENT ILLNESS: Patient is a 62 y.o. female established patient who presents today to discuss the medical issues below.    H/O radioactive iodine thyroid ablation  Feels good, labs did not include the thyroid.  Weigh is up a bit.      Anxiety  Doing well, increasing social activities.      Vitamin D deficiency  Patient on vit d supplement and unfortunately did not have done with current labs.      Obesity  Weight is up a bit. She's planning on joining [employer] with her friends.      Patient Active Problem List    Diagnosis Date Noted   • Blue nevus of scalp 12/13/2016   • Anxiety 12/13/2016   • Clotting disorder (CMS-[facility]) 02/23/2016   • Obesity 11/20/2015   • Vitamin D deficiency 11/20/2015   • GERD (gastroesophageal reflux disease) 02/05/2013   • H/O radioactive iodine thyroid ablation 02/05/2013       Allergies:Ceclor [cefaclor]; Pcn [penicillins]; Protonix [kdc:ci pigment blue 63+pantoprazole]; and Sulfa drugs    Current Outpatient Prescriptions   Medication Sig Dispense Refill   • omeprazole (PRILOSEC) 40 MG delayed-release capsule Take 1 Cap by mouth every day. 90 Cap 0   • Calcium Carbonate-Vitamin D (CALCIUM + D PO) Take  by mouth.     • Nutritional Supplements (JUICE PLUS FIBRE PO) Take  by mouth.     • vitamin D, Ergocalciferol, (DRISDOL) 98494 UNITS Cap capsule Take 4,000 Units by mouth every 7 days.     • estradiol (VAGIFEM) 10 MCG Tab Insert 10 mcg in vagina every day.     • aspirin 81 MG tablet Take 81 mg by mouth every day.     • sucralfate (CARAFATE) 1 GM Tab Take 1 Tab by mouth 4 Times a Day,Before Meals and at Bedtime. 120 Tab 3     No current facility-administered medications for this visit.          Past Medical History:   Diagnosis Date   • Anxiety 12/13/2016   • Clotting disorder (CMS-[facility]) 2/23/2016   • GERD (gastroesophageal reflux disease) 2/5/2013   • H/O radioactive iodine thyroid  "ablation 2/5/2013   • Obesity 11/20/2015   • Radiation     tx- thyroid   • Vitamin D deficiency 11/20/2015       Social History   Substance Use Topics   • Smoking status: Never Smoker   • Smokeless tobacco: Never Used   • Alcohol use 4.2 oz/week     7 Standard drinks or equivalent per week      Comment: soc       Family Status   Relation Status   • Mother Alive   • Father Alive   • Brother Alive   • Daughter Alive     Family History   Problem Relation Age of Onset   • Diabetes Mother    • Other Mother      macular degeneration   • Thyroid Mother    • Psychiatry Mother      mood disorder   • Heart Disease Mother      pacemaker   • Osteoporosis Mother    • Hyperlipidemia Father    • Heart Disease Father      pacemaker   • Cancer Father      prostate   • Hyperlipidemia Brother    • Cancer Brother      prostate       ROS:    Respiratory: Negative for cough, sputum production, shortness of breath or wheezing.    Cardiovascular: Negative for chest pain, palpitations, orthopnea, dyspnea with exertion or edema.   Gastrointestinal: Negative for GI upset, nausea, vomiting, abdominal pain, constipation or diarrhea.   Genitourinary: Negative for dysuria, urgency, hesitancy or frequency.       Exam:    Blood pressure 132/82, pulse 72, temperature 37.2 °C (98.9 °F), resp. rate 16, height 1.651 m (5' 5\"), weight 85.7 kg (189 lb), SpO2 99 %.  General:  Well nourished, well developed female in NAD.  HENT: Normocephalic, bilateral TMs are intact, nasal and oral mucosa with no lesions,   Neck: Supple without bruit. Thyroid is not enlarged.  Pulmonary: Clear to ausculation and percussion.  Normal effort. No rales, rhonchi, or wheezing.  Cardiovascular: Regular rate and rhythm without murmur.   Abdomen: Normal bowel sounds soft and nontender no palpable liver spleen bladder mass.  Extremities: No LE edema noted.  Neuro: Grossly nonfocal.  Psych: Alert and oriented to person, place, and time. Appropriate mood and conversation.    LABS: " Results reviewed and discussed with the patient, questions answered.      This dictation was created using voice recognition software. I have made reasonable attempts to correct errors, however, errors of grammar and content may exist.          Assessment/Plan:    1. H/O radioactive iodine thyroid ablation  Clinically euthyroid will add TSH the next draw at patient request  - TSH; Future    2. Anxiety  Long discussion held she is doing well with bereavement continues to support    3. Vitamin D deficiency  On vitamin D supplementation will add this lab to the next blood draw  - VITAMIN 1,25 DIHYDROXY; Future    4. Needs flu shot    - INFLUENZA VACCINE QUAD INJ >3Y(PF)    5. Hyperlipidemia, unspecified hyperlipidemia type  LDL is up in the 150 range, super high HDL somewhat protective however discussed diet exercise weight loss  - LIPID PROFILE; Future    6. Class 1 obesity due to excess calories without serious comorbidity with body mass index (BMI) of 30.0 to 30.9 in adult  Discussed diet, exercise, weight loss, behavioral modification, portion size management.      Patient was seen for  25 minutes face to face of which more than 50% of the time was spent in counseling and coordination of care regarding the above problems.

## 2018-02-09 RX ORDER — OMEPRAZOLE 40 MG/1
CAPSULE, DELAYED RELEASE ORAL
Qty: 90 CAP | Refills: 1 | Status: SHIPPED | OUTPATIENT
Start: 2018-02-09 | End: 2018-07-30 | Stop reason: SDUPTHER

## 2018-02-09 NOTE — TELEPHONE ENCOUNTER
Was the patient seen in the last year in this department? Yes     Does patient have an active prescription for medications requested? No     Received Request Via: Pharmacy      Pt met protocol?: Yes pt last ov 1/2018

## 2018-03-12 ENCOUNTER — APPOINTMENT (OUTPATIENT)
Dept: RADIOLOGY | Facility: IMAGING CENTER | Age: 63
End: 2018-03-12
Attending: CHIROPRACTOR
Payer: OTHER MISCELLANEOUS

## 2018-03-12 ENCOUNTER — NON-PROVIDER VISIT (OUTPATIENT)
Dept: URGENT CARE | Facility: PHYSICIAN GROUP | Age: 63
End: 2018-03-12

## 2018-03-12 DIAGNOSIS — M54.2 NECK PAIN: ICD-10-CM

## 2018-03-12 PROCEDURE — 72050 X-RAY EXAM NECK SPINE 4/5VWS: CPT | Mod: TC,FY | Performed by: FAMILY MEDICINE

## 2018-09-26 DIAGNOSIS — E55.9 VITAMIN D DEFICIENCY: ICD-10-CM

## 2018-09-27 ENCOUNTER — OFFICE VISIT (OUTPATIENT)
Dept: MEDICAL GROUP | Facility: PHYSICIAN GROUP | Age: 63
End: 2018-09-27
Payer: OTHER MISCELLANEOUS

## 2018-09-27 VITALS
HEART RATE: 64 BPM | BODY MASS INDEX: 30.89 KG/M2 | WEIGHT: 185.4 LBS | HEIGHT: 65 IN | SYSTOLIC BLOOD PRESSURE: 138 MMHG | RESPIRATION RATE: 14 BRPM | OXYGEN SATURATION: 99 % | TEMPERATURE: 99.6 F | DIASTOLIC BLOOD PRESSURE: 66 MMHG

## 2018-09-27 DIAGNOSIS — E55.9 VITAMIN D DEFICIENCY: ICD-10-CM

## 2018-09-27 DIAGNOSIS — Z92.3 H/O RADIOACTIVE IODINE THYROID ABLATION: Chronic | ICD-10-CM

## 2018-09-27 DIAGNOSIS — Z23 NEED FOR VACCINATION: ICD-10-CM

## 2018-09-27 DIAGNOSIS — E78.5 DYSLIPIDEMIA: ICD-10-CM

## 2018-09-27 DIAGNOSIS — K21.00 GASTROESOPHAGEAL REFLUX DISEASE WITH ESOPHAGITIS: Chronic | ICD-10-CM

## 2018-09-27 PROCEDURE — 90686 IIV4 VACC NO PRSV 0.5 ML IM: CPT | Performed by: NURSE PRACTITIONER

## 2018-09-27 PROCEDURE — 99214 OFFICE O/P EST MOD 30 MIN: CPT | Mod: 25 | Performed by: NURSE PRACTITIONER

## 2018-09-27 PROCEDURE — 90471 IMMUNIZATION ADMIN: CPT | Performed by: NURSE PRACTITIONER

## 2018-09-27 RX ORDER — OMEPRAZOLE 20 MG/1
20 CAPSULE, DELAYED RELEASE ORAL DAILY
Qty: 30 CAP | Refills: 0 | Status: SHIPPED | OUTPATIENT
Start: 2018-09-27 | End: 2019-03-27

## 2018-09-27 ASSESSMENT — PATIENT HEALTH QUESTIONNAIRE - PHQ9: CLINICAL INTERPRETATION OF PHQ2 SCORE: 0

## 2018-09-27 NOTE — PROGRESS NOTES
[facility]  Primary Care Office Visit - Problem-Oriented        History:     Nell Fair is a 63 y.o. female who is here today to discuss Thyroid Problem (FV) and Medication Refill (omeprazole)      GERD (gastroesophageal reflux disease)  Patient is a 63-year-old female with GERD, she continues on daily PPI, has done some reading and is concerned about renal function.  She would like to get off of this medication.  She did have EGD in 2016 which showed inflammation only, no Orozco's or aplasia.  We did discuss diet, use of H 2 antagonist.    Vitamin D deficiency  This is a chronic condition which is well controlled on medications. Patient is tolerating medications without side effects.      Dyslipidemia  Patient has modest elevation of total cholesterol and LDL though with cardio protective HDL at 79.  She is a routine exerciser.  Working on weight loss per        Past Medical History:   Diagnosis Date   • Anxiety 12/13/2016   • Clotting disorder (HCC) 2/23/2016   • GERD (gastroesophageal reflux disease) 2/5/2013   • H/O radioactive iodine thyroid ablation 2/5/2013   • Obesity 11/20/2015   • Radiation     tx- thyroid   • Vitamin D deficiency 11/20/2015     Past Surgical History:   Procedure Laterality Date   • ABDOMINAL HYSTERECTOMY TOTAL      BSO     Social History     Social History   • Marital status:      Spouse name: N/A   • Number of children: N/A   • Years of education: N/A     Occupational History   • Not on file.     Social History Main Topics   • Smoking status: Never Smoker   • Smokeless tobacco: Never Used   • Alcohol use 4.2 oz/week     7 Standard drinks or equivalent per week      Comment: soc   • Drug use: No   • Sexual activity: Yes     Partners: Male     Other Topics Concern   • Not on file     Social History Narrative   • No narrative on file     History   Smoking Status   • Never Smoker   Smokeless Tobacco   • Never Used     Family History   Problem Relation Age of Onset    • Diabetes Mother    • Other Mother         macular degeneration   • Thyroid Mother    • Psychiatry Mother         mood disorder   • Heart Disease Mother         pacemaker   • Osteoporosis Mother    • Hyperlipidemia Father    • Heart Disease Father         pacemaker   • Cancer Father         prostate   • Hyperlipidemia Brother    • Cancer Brother         prostate     Allergies   Allergen Reactions   • Ceclor [Cefaclor] Rash   • Pcn [Penicillins] Rash   • Protonix [Kdc:Ci Pigment Blue 63+Pantoprazole] Unspecified     dizziness   • Sulfa Drugs        Problem List:     Patient Active Problem List    Diagnosis Date Noted   • Dyslipidemia 09/27/2018   • Blue nevus of scalp 12/13/2016   • Anxiety 12/13/2016   • Clotting disorder (HCC) 02/23/2016   • Obesity 11/20/2015   • Vitamin D deficiency 11/20/2015   • GERD (gastroesophageal reflux disease) 02/05/2013   • H/O radioactive iodine thyroid ablation 02/05/2013         Medications:     Current Outpatient Prescriptions:   •  B Complex Vitamins (VITAMIN-B COMPLEX PO), Take  by mouth., Disp: , Rfl:   •  omeprazole (PRILOSEC) 20 MG delayed-release capsule, Take 1 Cap by mouth every day., Disp: 30 Cap, Rfl: 0  •  Calcium Carbonate-Vitamin D (CALCIUM + D PO), Take  by mouth., Disp: , Rfl:   •  Nutritional Supplements (JUICE PLUS FIBRE PO), Take  by mouth., Disp: , Rfl:   •  vitamin D, Ergocalciferol, (DRISDOL) 74659 UNITS Cap capsule, Take 4,000 Units by mouth every 7 days., Disp: , Rfl:   •  estradiol (VAGIFEM) 10 MCG Tab, Insert 10 mcg in vagina every day., Disp: , Rfl:   •  aspirin 81 MG tablet, Take 81 mg by mouth every day., Disp: , Rfl:   •  sucralfate (CARAFATE) 1 GM Tab, Take 1 Tab by mouth 4 Times a Day,Before Meals and at Bedtime., Disp: 120 Tab, Rfl: 3      Review of Systems:     Pertinent positives as per HPI, all other systems reviewed and WNL    Physical Assessment:     VS: /66 (BP Location: Left arm, Patient Position: Sitting, BP Cuff Size: Adult)   Pulse  "64   Temp 37.6 °C (99.6 °F) (Temporal)   Resp 14   Ht 1.651 m (5' 5\")   Wt 84.1 kg (185 lb 6.4 oz)   SpO2 99%   Breastfeeding? No   BMI 30.85 kg/m²     General: Well-developed, well-nourished, female     Head: PERRL, EOMI. Normocephalic. No facial asymmetry noted.  Neck: Neck supple, no thyromegaly  Cardiovasc: RRR, no MRG. No thrills or bruits. Pulses 2+ and symmetric at all distal extremities.  Pulmonary: Lungs clear bilaterally.  Normal respiratory effort. No wheeze or crackles.   Neuro: Alert and oriented, CNs II-XII intact, no focal deficits.  Skin:No rashes noted. Skin warm, dry, intact    Psych: Dressed appropriately for the weather, pleasant and conversant.  Affect, mood & judgment appropriate.    Assessment/Plan:   Nell was seen today for thyroid problem and medication refill.    Diagnoses and all orders for this visit:    H/O radioactive iodine thyroid ablation  - labs stable, continue to monitor     Gastroesophageal reflux disease with esophagitis, stable   - Counseled on chronic use of PPI, recommend first decreasing daily dose, then every other day dosing with ranitidine as needed for rebound symptoms and dietary modifications. Dietary modification. Follow up PRN.     -     omeprazole (PRILOSEC) 20 MG delayed-release capsule; Take 1 Cap by mouth every day.    Need for vaccination  -     Flu Quad Inj >3 Year Pre-Filled PF    Vitamin D deficiency, resolved with OTC supplement, monitor     Dyslipidemia, stable   - elevated LDL though with cardioprotective HDL, diet, exercise, monitor       Patient is agreeable to the above plan and voiced understanding. All questions answered.     Please note that this dictation was created using voice recognition software. I have made every reasonable attempt to correct obvious errors, but I expect that there are errors of grammar and possibly content that I did not discover before finalizing the note.      JAKE Chavez  9/27/2018, 11:16 AM  "

## 2018-09-27 NOTE — ASSESSMENT & PLAN NOTE
Patient is a 63-year-old female with GERD, she continues on daily PPI, has done some reading and is concerned about renal function.  She would like to get off of this medication.  She did have EGD in 2016 which showed inflammation only, no Orozco's or aplasia.  We did discuss diet, use of H 2 antagonist.

## 2018-09-27 NOTE — ASSESSMENT & PLAN NOTE
Patient has modest elevation of total cholesterol and LDL though with cardio protective HDL at 79.  She is a routine exerciser.  Working on weight loss per

## 2018-09-27 NOTE — PATIENT INSTRUCTIONS
To do:     - start omeprazole 20mg a day x 1 week, then every other day x 1 week and then stop.     You can use the zantac twice a day as needed from here on out. It is safe to take with the omeprazole.

## 2018-11-27 ENCOUNTER — TELEPHONE (OUTPATIENT)
Dept: MEDICAL GROUP | Facility: PHYSICIAN GROUP | Age: 63
End: 2018-11-27

## 2018-11-27 DIAGNOSIS — Z92.3 H/O RADIOACTIVE IODINE THYROID ABLATION: Chronic | ICD-10-CM

## 2018-11-27 DIAGNOSIS — E78.5 DYSLIPIDEMIA: ICD-10-CM

## 2018-11-27 DIAGNOSIS — E66.09 CLASS 1 OBESITY DUE TO EXCESS CALORIES WITHOUT SERIOUS COMORBIDITY WITH BODY MASS INDEX (BMI) OF 30.0 TO 30.9 IN ADULT: ICD-10-CM

## 2018-11-27 NOTE — TELEPHONE ENCOUNTER
1. Caller Name: Nell Fair                                      Call Back Number: [phone]      Patient approves a detailed voicemail message: N\A    Pt called and is wondering If you can put her back on the omeprazole. She said at her last appointment you guys talked about taking her off of It but she is getting miserable because it comes and goes. She said the 20 mg was doing good. Please advice. She said if with the 20 mg if she can take zantac? She said she does want to eventually want to wean off the omeprazole again, which she was doing good with but she doesn't know why all of a sudden she felt bad again.

## 2018-11-28 RX ORDER — OMEPRAZOLE 20 MG/1
20 CAPSULE, DELAYED RELEASE ORAL DAILY
Qty: 30 CAP | Refills: 5 | Status: SHIPPED | OUTPATIENT
Start: 2018-11-28 | End: 2019-03-29

## 2018-11-28 NOTE — TELEPHONE ENCOUNTER
Omeprazole is sent to the pharmacy. Ok to restart and then just take the zantac prn   Should have labs and follow up appointment 8-10 weeks.

## 2019-02-14 ENCOUNTER — OFFICE VISIT (OUTPATIENT)
Dept: URGENT CARE | Facility: PHYSICIAN GROUP | Age: 64
End: 2019-02-14
Payer: OTHER MISCELLANEOUS

## 2019-02-14 VITALS
OXYGEN SATURATION: 97 % | SYSTOLIC BLOOD PRESSURE: 128 MMHG | WEIGHT: 188 LBS | TEMPERATURE: 98.4 F | HEART RATE: 84 BPM | RESPIRATION RATE: 16 BRPM | DIASTOLIC BLOOD PRESSURE: 90 MMHG | BODY MASS INDEX: 31.32 KG/M2 | HEIGHT: 65 IN

## 2019-02-14 DIAGNOSIS — R05.9 COUGH: ICD-10-CM

## 2019-02-14 DIAGNOSIS — J40 BRONCHITIS: ICD-10-CM

## 2019-02-14 PROCEDURE — 99214 OFFICE O/P EST MOD 30 MIN: CPT | Performed by: PHYSICIAN ASSISTANT

## 2019-02-14 RX ORDER — DEXTROMETHORPHAN HYDROBROMIDE AND PROMETHAZINE HYDROCHLORIDE 15; 6.25 MG/5ML; MG/5ML
5 SYRUP ORAL EVERY 4 HOURS PRN
Qty: 120 ML | Refills: 0 | Status: SHIPPED | OUTPATIENT
Start: 2019-02-14 | End: 2019-03-27

## 2019-02-14 RX ORDER — METHYLPREDNISOLONE 4 MG/1
4 TABLET ORAL SEE ADMIN INSTRUCTIONS
Qty: 21 TAB | Refills: 0 | Status: SHIPPED | OUTPATIENT
Start: 2019-02-14 | End: 2019-03-27

## 2019-02-14 RX ORDER — AZITHROMYCIN 250 MG/1
TABLET, FILM COATED ORAL
Qty: 6 TAB | Refills: 0 | Status: SHIPPED | OUTPATIENT
Start: 2019-02-14 | End: 2019-03-27

## 2019-02-14 NOTE — PROGRESS NOTES
Chief Complaint   Patient presents with   • Cough       HISTORY OF PRESENT ILLNESS: Patient is a 63 y.o. female who presents today because she has she has a 5-day history of worsening cough.  She denies any fevers, chills, nausea, vomiting or diarrhea.  The cough is very dry, harsh, hurts when she coughs.  She has taken some over-the-counter cough and cold medications without improvement    Patient Active Problem List    Diagnosis Date Noted   • Dyslipidemia 09/27/2018   • Blue nevus of scalp 12/13/2016   • Anxiety 12/13/2016   • Clotting disorder (HCC) 02/23/2016   • Obesity 11/20/2015   • Vitamin D deficiency 11/20/2015   • GERD (gastroesophageal reflux disease) 02/05/2013   • H/O radioactive iodine thyroid ablation 02/05/2013       Allergies:Ceclor [cefaclor]; Pcn [penicillins]; Protonix [kdc:ci pigment blue 63+pantoprazole]; and Sulfa drugs    Current Outpatient Prescriptions Ordered in [facility]   Medication Sig Dispense Refill   • promethazine-dextromethorphan (PROMETHAZINE-DM) 6.25-15 MG/5ML syrup Take 5 mL by mouth every four hours as needed for Cough. 120 mL 0   • MethylPREDNISolone (MEDROL DOSEPAK) 4 MG Tablet Therapy Pack Take 1 Tab by mouth See Admin Instructions. 21 Tab 0   • azithromycin (ZITHROMAX) 250 MG Tab Follow package directions 6 Tab 0   • omeprazole (PRILOSEC) 20 MG delayed-release capsule Take 1 Cap by mouth every day. 30 Cap 5   • B Complex Vitamins (VITAMIN-B COMPLEX PO) Take  by mouth.     • omeprazole (PRILOSEC) 20 MG delayed-release capsule Take 1 Cap by mouth every day. 30 Cap 0   • sucralfate (CARAFATE) 1 GM Tab Take 1 Tab by mouth 4 Times a Day,Before Meals and at Bedtime. 120 Tab 3   • Calcium Carbonate-Vitamin D (CALCIUM + D PO) Take  by mouth.     • Nutritional Supplements (JUICE PLUS FIBRE PO) Take  by mouth.     • vitamin D, Ergocalciferol, (DRISDOL) 52666 UNITS Cap capsule Take 4,000 Units by mouth every 7 days.     • estradiol (VAGIFEM) 10 MCG Tab Insert 10 mcg in vagina every day.   "   • aspirin 81 MG tablet Take 81 mg by mouth every day.       No current [facility]-ordered facility-administered medications on file.        Past Medical History:   Diagnosis Date   • Anxiety 12/13/2016   • Clotting disorder (HCC) 2/23/2016   • GERD (gastroesophageal reflux disease) 2/5/2013   • H/O radioactive iodine thyroid ablation 2/5/2013   • Obesity 11/20/2015   • Radiation     tx- thyroid   • Vitamin D deficiency 11/20/2015       Social History   Substance Use Topics   • Smoking status: Never Smoker   • Smokeless tobacco: Never Used   • Alcohol use 4.2 oz/week     7 Standard drinks or equivalent per week      Comment: soc       Family Status   Relation Status   • Mo Alive   • Fa Alive   • Bro Alive   • Barry Alive     Family History   Problem Relation Age of Onset   • Diabetes Mother    • Other Mother         macular degeneration   • Thyroid Mother    • Psychiatry Mother         mood disorder   • Heart Disease Mother         pacemaker   • Osteoporosis Mother    • Hyperlipidemia Father    • Heart Disease Father         pacemaker   • Cancer Father         prostate   • Hyperlipidemia Brother    • Cancer Brother         prostate       ROS:  Review of Systems   Constitutional: Negative for fever, chills, weight loss and malaise/fatigue.   HENT: Negative for ear pain, nosebleeds, congestion, sore throat and neck pain.    Eyes: Negative for blurred vision.   Respiratory: Positive for cough, minimal sputum production, no shortness of breath and wheezing.    Cardiovascular: Negative for chest pain, palpitations, orthopnea and leg swelling.   Gastrointestinal: Negative for heartburn, nausea, vomiting and abdominal pain.   Genitourinary: Negative for dysuria, urgency and frequency.     Exam:  Blood pressure 128/90, pulse 84, temperature 36.9 °C (98.4 °F), temperature source Temporal, resp. rate 16, height 1.651 m (5' 5\"), weight 85.3 kg (188 lb), SpO2 97 %, not currently breastfeeding.  General:  Well nourished, well " developed female in NAD, frequent dry harsh cough  Head:Normocephalic, atraumatic  Eyes: PERRLA, EOM within normal limits, no conjunctival injection, no scleral icterus, visual fields and acuity grossly intact.  Ears: Normal shape and symmetry, no tenderness, no discharge. External canals are without any significant edema or erythema. Tympanic membranes are without any inflammation, no effusion. Gross auditory acuity is intact  Nose: Symmetrical without tenderness, no discharge.  Mouth: reasonable hygiene, no erythema exudates or tonsillar enlargement.  Neck: no masses, range of motion within normal limits, no tracheal deviation. No obvious thyroid enlargement.  Pulmonary: chest is symmetrical with respiration, no wheezes, crackles, occasional rhonchi, clearing with cough, bronchial bilaterally  Cardiovascular: regular rate and rhythm without murmurs, rubs, or gallops.  Extremities: no clubbing, cyanosis, or edema.    Please note that this dictation was created using voice recognition software. I have made every reasonable attempt to correct obvious errors, but I expect that there are errors of grammar and possibly content that I did not discover before finalizing the note.    Assessment/Plan:  1. Bronchitis  MethylPREDNISolone (MEDROL DOSEPAK) 4 MG Tablet Therapy Pack    azithromycin (ZITHROMAX) 250 MG Tab   2. Cough  promethazine-dextromethorphan (PROMETHAZINE-DM) 6.25-15 MG/5ML syrup   Azithromycin to oppose the Medrol    Followup with primary care in the next 7-10 days if not significantly improving, return to the urgent care or go to the emergency room sooner for any worsening of symptoms.

## 2019-02-27 ENCOUNTER — OFFICE VISIT (OUTPATIENT)
Dept: URGENT CARE | Facility: PHYSICIAN GROUP | Age: 64
End: 2019-02-27
Payer: OTHER MISCELLANEOUS

## 2019-02-27 VITALS
DIASTOLIC BLOOD PRESSURE: 82 MMHG | RESPIRATION RATE: 14 BRPM | HEIGHT: 65 IN | OXYGEN SATURATION: 99 % | SYSTOLIC BLOOD PRESSURE: 138 MMHG | TEMPERATURE: 97.7 F | HEART RATE: 84 BPM | WEIGHT: 188 LBS | BODY MASS INDEX: 31.32 KG/M2

## 2019-02-27 DIAGNOSIS — S29.011A INTERCOSTAL MUSCLE STRAIN, INITIAL ENCOUNTER: ICD-10-CM

## 2019-02-27 PROCEDURE — 99214 OFFICE O/P EST MOD 30 MIN: CPT | Performed by: PHYSICIAN ASSISTANT

## 2019-02-27 RX ORDER — DICLOFENAC SODIUM 75 MG/1
75 TABLET, DELAYED RELEASE ORAL 2 TIMES DAILY
Qty: 30 TAB | Refills: 0 | Status: SHIPPED | OUTPATIENT
Start: 2019-02-27 | End: 2019-03-27

## 2019-02-27 NOTE — PROGRESS NOTES
Chief Complaint   Patient presents with   • Rib Pain       HISTORY OF PRESENT ILLNESS: Patient is a 63 y.o. female who presents today because she has posterior rib pain.  She was recently treated for bronchitis with azithromycin and is a right and most of her symptoms got better but from coughing she has developed posterior left-sided rib pain.  She has not been taking any medications for it.  States that otherwise she feels fine    Patient Active Problem List    Diagnosis Date Noted   • Dyslipidemia 09/27/2018   • Blue nevus of scalp 12/13/2016   • Anxiety 12/13/2016   • Clotting disorder (HCC) 02/23/2016   • Obesity 11/20/2015   • Vitamin D deficiency 11/20/2015   • GERD (gastroesophageal reflux disease) 02/05/2013   • H/O radioactive iodine thyroid ablation 02/05/2013       Allergies:Ceclor [cefaclor]; Pcn [penicillins]; Protonix [kdc:ci pigment blue 63+pantoprazole]; and Sulfa drugs    Current Outpatient Prescriptions Ordered in [facility]   Medication Sig Dispense Refill   • diclofenac EC (VOLTAREN) 75 MG Tablet Delayed Response Take 1 Tab by mouth 2 times a day. 30 Tab 0   • promethazine-dextromethorphan (PROMETHAZINE-DM) 6.25-15 MG/5ML syrup Take 5 mL by mouth every four hours as needed for Cough. 120 mL 0   • MethylPREDNISolone (MEDROL DOSEPAK) 4 MG Tablet Therapy Pack Take 1 Tab by mouth See Admin Instructions. 21 Tab 0   • azithromycin (ZITHROMAX) 250 MG Tab Follow package directions 6 Tab 0   • omeprazole (PRILOSEC) 20 MG delayed-release capsule Take 1 Cap by mouth every day. 30 Cap 5   • B Complex Vitamins (VITAMIN-B COMPLEX PO) Take  by mouth.     • omeprazole (PRILOSEC) 20 MG delayed-release capsule Take 1 Cap by mouth every day. 30 Cap 0   • sucralfate (CARAFATE) 1 GM Tab Take 1 Tab by mouth 4 Times a Day,Before Meals and at Bedtime. 120 Tab 3   • Calcium Carbonate-Vitamin D (CALCIUM + D PO) Take  by mouth.     • Nutritional Supplements (JUICE PLUS FIBRE PO) Take  by mouth.     • vitamin D, Ergocalciferol,  (DRISDOL) 12966 UNITS Cap capsule Take 4,000 Units by mouth every 7 days.     • estradiol (VAGIFEM) 10 MCG Tab Insert 10 mcg in vagina every day.     • aspirin 81 MG tablet Take 81 mg by mouth every day.       No current [facility]-ordered facility-administered medications on file.        Past Medical History:   Diagnosis Date   • Anxiety 12/13/2016   • Clotting disorder (HCC) 2/23/2016   • GERD (gastroesophageal reflux disease) 2/5/2013   • H/O radioactive iodine thyroid ablation 2/5/2013   • Obesity 11/20/2015   • Radiation     tx- thyroid   • Vitamin D deficiency 11/20/2015       Social History   Substance Use Topics   • Smoking status: Never Smoker   • Smokeless tobacco: Never Used   • Alcohol use 4.2 oz/week     7 Standard drinks or equivalent per week      Comment: soc       Family Status   Relation Status   • Mo Alive   • Fa Alive   • Bro Alive   • Barry Alive     Family History   Problem Relation Age of Onset   • Diabetes Mother    • Other Mother         macular degeneration   • Thyroid Mother    • Psychiatry Mother         mood disorder   • Heart Disease Mother         pacemaker   • Osteoporosis Mother    • Hyperlipidemia Father    • Heart Disease Father         pacemaker   • Cancer Father         prostate   • Hyperlipidemia Brother    • Cancer Brother         prostate       ROS:  Review of Systems   Constitutional: Negative for fever, chills, weight loss and malaise/fatigue.   HENT: Negative for ear pain, nosebleeds, congestion, sore throat and neck pain.    Eyes: Negative for blurred vision.   Respiratory: Negative for cough, sputum production, shortness of breath and wheezing.    Cardiovascular: Negative for chest pain, palpitations, orthopnea and leg swelling.   Gastrointestinal: Negative for heartburn, nausea, vomiting and abdominal pain.   Genitourinary: Negative for dysuria, urgency and frequency.     Exam:  Blood pressure 138/82, pulse 84, temperature 36.5 °C (97.7 °F), temperature source Temporal, resp.  "rate 14, height 1.651 m (5' 5\"), weight 85.3 kg (188 lb), SpO2 99 %, not currently breastfeeding.  General:  Well nourished, well developed female in NAD  Head:Normocephalic, atraumatic  Eyes: PERRLA, EOM within normal limits, no conjunctival injection, no scleral icterus, visual fields and acuity grossly intact.  Ears: Normal shape and symmetry, no tenderness, no discharge. External canals are without any significant edema or erythema. Tympanic membranes are without any inflammation, no effusion. Gross auditory acuity is intact  Nose: Symmetrical without tenderness, no discharge.  Mouth: reasonable hygiene, no erythema exudates or tonsillar enlargement.  Neck: no masses, range of motion within normal limits, no tracheal deviation. No obvious thyroid enlargement.  Pulmonary: chest is symmetrical with respiration, no wheezes, crackles, or rhonchi.  Cardiovascular: regular rate and rhythm without murmurs, rubs, or gallops.  Extremities: no clubbing, cyanosis, or edema.  Musculoskeletal: She has tenderness to palpation of the periscapular muscles on the left.    Please note that this dictation was created using voice recognition software. I have made every reasonable attempt to correct obvious errors, but I expect that there are errors of grammar and possibly content that I did not discover before finalizing the note.    Assessment/Plan:  1. Intercostal muscle strain, initial encounter  diclofenac EC (VOLTAREN) 75 MG Tablet Delayed Response   Apply heat to the area as tolerated.  Medication as above.  Reassured patient.    Followup with primary care in the next 7-10 days if not significantly improving, return to the urgent care or go to the emergency room sooner for any worsening of symptoms.       "

## 2019-03-27 ENCOUNTER — OFFICE VISIT (OUTPATIENT)
Dept: MEDICAL GROUP | Facility: PHYSICIAN GROUP | Age: 64
End: 2019-03-27
Payer: OTHER MISCELLANEOUS

## 2019-03-27 VITALS
DIASTOLIC BLOOD PRESSURE: 70 MMHG | OXYGEN SATURATION: 98 % | HEART RATE: 74 BPM | RESPIRATION RATE: 12 BRPM | BODY MASS INDEX: 30.99 KG/M2 | WEIGHT: 186 LBS | TEMPERATURE: 99.6 F | SYSTOLIC BLOOD PRESSURE: 134 MMHG | HEIGHT: 65 IN

## 2019-03-27 DIAGNOSIS — Z92.3 H/O RADIOACTIVE IODINE THYROID ABLATION: Chronic | ICD-10-CM

## 2019-03-27 DIAGNOSIS — E66.09 CLASS 1 OBESITY DUE TO EXCESS CALORIES WITHOUT SERIOUS COMORBIDITY WITH BODY MASS INDEX (BMI) OF 30.0 TO 30.9 IN ADULT: ICD-10-CM

## 2019-03-27 DIAGNOSIS — K21.00 GASTROESOPHAGEAL REFLUX DISEASE WITH ESOPHAGITIS: Chronic | ICD-10-CM

## 2019-03-27 DIAGNOSIS — F41.9 ANXIETY: ICD-10-CM

## 2019-03-27 PROCEDURE — 99214 OFFICE O/P EST MOD 30 MIN: CPT | Performed by: INTERNAL MEDICINE

## 2019-03-27 RX ORDER — RANITIDINE 150 MG/1
150 TABLET ORAL 2 TIMES DAILY
COMMUNITY
End: 2019-03-27

## 2019-03-27 ASSESSMENT — PATIENT HEALTH QUESTIONNAIRE - PHQ9: CLINICAL INTERPRETATION OF PHQ2 SCORE: 0

## 2019-03-27 NOTE — PROGRESS NOTES
Chief Complaint   Patient presents with   • Anxiety   • Medication Management     omeprazole       HISTORY OF PRESENT ILLNESS: Patient is a 63 y.o. female established patient who presents today to discuss the medical issues below.    GERD (gastroesophageal reflux disease)  Patient has adjusted meds, she is currently using the omeprazole at bedtime and zantac in am.  She reports currently she is she is doing well with only rare breakthru.     H/O radioactive iodine thyroid ablation  Partial ablation, on no thyroid replacement.      Obesity  Working on weight loss, she is cutting portions.  Weight is dropping a bit gradually.      Anxiety  Patient reports she is starting more exercise.  Feeling more motivated with activity and anxiety.        Patient Active Problem List    Diagnosis Date Noted   • Dyslipidemia 09/27/2018   • Blue nevus of scalp 12/13/2016   • Anxiety 12/13/2016   • Clotting disorder (HCC) 02/23/2016   • Obesity 11/20/2015   • Vitamin D deficiency 11/20/2015   • GERD (gastroesophageal reflux disease) 02/05/2013   • H/O radioactive iodine thyroid ablation 02/05/2013       Allergies:Ceclor [cefaclor]; Pcn [penicillins]; Protonix [kdc:ci pigment blue 63+pantoprazole]; and Sulfa drugs    Current Outpatient Prescriptions   Medication Sig Dispense Refill   • omeprazole (PRILOSEC) 20 MG delayed-release capsule Take 1 Cap by mouth every day. 30 Cap 5   • B Complex Vitamins (VITAMIN-B COMPLEX PO) Take  by mouth.     • Calcium Carbonate-Vitamin D (CALCIUM + D PO) Take  by mouth.     • vitamin D, Ergocalciferol, (DRISDOL) 90033 UNITS Cap capsule Take 4,000 Units by mouth every 7 days.     • estradiol (VAGIFEM) 10 MCG Tab Insert 10 mcg in vagina every day.     • aspirin 81 MG tablet Take 81 mg by mouth every day.     • Nutritional Supplements (JUICE PLUS FIBRE PO) Take  by mouth.       No current facility-administered medications for this visit.          Past Medical History:   Diagnosis Date   • Anxiety  "12/13/2016   • Clotting disorder (HCC) 2/23/2016   • GERD (gastroesophageal reflux disease) 2/5/2013   • H/O radioactive iodine thyroid ablation 2/5/2013   • Obesity 11/20/2015   • Radiation     tx- thyroid   • Vitamin D deficiency 11/20/2015       Social History   Substance Use Topics   • Smoking status: Never Smoker   • Smokeless tobacco: Never Used   • Alcohol use 4.2 oz/week     7 Standard drinks or equivalent per week      Comment: soc       Family Status   Relation Status   • Mo Alive   • Fa Alive   • Bro Alive   • Barry Alive     Family History   Problem Relation Age of Onset   • Diabetes Mother    • Other Mother         macular degeneration   • Thyroid Mother    • Psychiatry Mother         mood disorder   • Heart Disease Mother         pacemaker   • Osteoporosis Mother    • Hyperlipidemia Father    • Heart Disease Father         pacemaker   • Cancer Father         prostate   • Hyperlipidemia Brother    • Cancer Brother         prostate       ROS:    Respiratory: Negative for cough, sputum production, shortness of breath or wheezing.    Cardiovascular: Negative for chest pain, palpitations, orthopnea, dyspnea with exertion or edema.   Gastrointestinal: Negative for GI upset, nausea, vomiting, abdominal pain, constipation or diarrhea.   Genitourinary: Negative for dysuria, urgency, hesitancy or frequency.       Exam:    Blood pressure 134/70, pulse 74, temperature 37.6 °C (99.6 °F), resp. rate 12, height 1.651 m (5' 5\"), weight 84.4 kg (186 lb), SpO2 98 %, not currently breastfeeding.  General:  Well nourished, well developed female in NAD.  HENT: Normocephalic, bilateral TMs are intact, nasal and oral mucosa with no lesions,   Neck: Supple without bruit. Thyroid is not enlarged.  Pulmonary: Clear to ausculation and percussion.  Normal effort. No rales, rhonchi, or wheezing.  Cardiovascular: Regular rate and rhythm without murmur.   Abdomen: Normal bowel sounds soft and nontender no palpable liver spleen bladder " mass.  Extremities: No LE edema noted.  Neuro: Grossly nonfocal.  Psych: Alert and oriented to person, place, and time. Appropriate mood and conversation.        This dictation was created using voice recognition software. I have made reasonable attempts to correct errors, however, errors of grammar and content may exist.          Assessment/Plan:    1. Gastroesophageal reflux disease with esophagitis  Risk benefit ratio discussed.  She is having breakthrough symptoms on the 20 mg of omeprazole and Zantac 150s.  Will return to the 40 mg dose and remain there with ongoing liver function renal function monitoring.  Discussed implications of weight.    2. Class 1 obesity due to excess calories without serious comorbidity with body mass index (BMI) of 30.0 to 30.9 in adult  Discussed diet, exercise, weight loss, behavioral modification, portion size management.    - Patient identified as having weight management issue.  Appropriate orders and counseling given.    3. H/O radioactive iodine thyroid ablation  Clinically euthyroid will be due for lab assessment in September 4. Anxiety  Much improved regular physical exercise discussed       Patient was seen for 25 minutes face to face of which more than 50% of the time was spent in counseling and coordination of care regarding the above problems.

## 2019-03-27 NOTE — ASSESSMENT & PLAN NOTE
Patient has adjusted meds, she is currently using the omeprazole at bedtime and zantac in am.  She reports currently she is she is doing well with only rare breakthru.

## 2019-03-27 NOTE — ASSESSMENT & PLAN NOTE
Patient reports she is starting more exercise.  Feeling more motivated with activity and anxiety.

## 2019-03-28 ENCOUNTER — TELEPHONE (OUTPATIENT)
Dept: MEDICAL GROUP | Facility: PHYSICIAN GROUP | Age: 64
End: 2019-03-28

## 2019-03-28 RX ORDER — PANTOPRAZOLE SODIUM 40 MG/1
40 TABLET, DELAYED RELEASE ORAL DAILY
Qty: 30 TAB | Refills: 1 | Status: SHIPPED | OUTPATIENT
Start: 2019-03-28 | End: 2019-03-29

## 2019-03-28 NOTE — TELEPHONE ENCOUNTER
1. Caller Name: Kobe                      Call Back Number: [phone]    2. Message: Pharmacy called and stated that Nell went to  her new RX for pantoprazole 40 mgs but they had not received the refill request.  I see in your OV note that you were going to increase to 40 mgs instead of 20 MGs.  Can a prescription for 40 MGs [facility]?      3. Patient approves office to leave a detailed voicemail/MyChart message: N\A

## 2019-03-29 ENCOUNTER — TELEPHONE (OUTPATIENT)
Dept: MEDICAL GROUP | Facility: PHYSICIAN GROUP | Age: 64
End: 2019-03-29

## 2019-03-29 RX ORDER — OMEPRAZOLE 40 MG/1
40 CAPSULE, DELAYED RELEASE ORAL DAILY
Qty: 90 CAP | Refills: 1 | Status: SHIPPED | OUTPATIENT
Start: 2019-03-29 | End: 2019-10-09

## 2019-03-29 NOTE — TELEPHONE ENCOUNTER
"1. Caller Name: Nell                      Call Back Number: [phone]     2. Message: Nell picked up the medication for her heartburn yesterday and she said that Protonix was prescribed.  She cant take Protonix because it mad her feel \"weird\".  She asked if you could prescribe omeprazole 40 mgs instead?  Please advise     3. Patient approves office to leave a detailed voicemail/MyChart message: N\A      "

## 2019-05-14 ENCOUNTER — TELEPHONE (OUTPATIENT)
Dept: MEDICAL GROUP | Facility: PHYSICIAN GROUP | Age: 64
End: 2019-05-14

## 2019-05-15 RX ORDER — PANTOPRAZOLE SODIUM 40 MG/1
40 TABLET, DELAYED RELEASE ORAL DAILY
Qty: 30 TAB | Refills: 0 | Status: SHIPPED | OUTPATIENT
Start: 2019-05-15 | End: 2019-07-11 | Stop reason: SDUPTHER

## 2019-05-15 NOTE — TELEPHONE ENCOUNTER
She can try protonix. Take on an empty stomach 30 minutes before food or drink. Not to be taken with the omeprazole. Needs to follow up with PCP .

## 2019-05-28 ENCOUNTER — TELEPHONE (OUTPATIENT)
Dept: MEDICAL GROUP | Facility: PHYSICIAN GROUP | Age: 64
End: 2019-05-28

## 2019-05-28 DIAGNOSIS — K21.00 GASTROESOPHAGEAL REFLUX DISEASE WITH ESOPHAGITIS: Chronic | ICD-10-CM

## 2019-05-28 NOTE — TELEPHONE ENCOUNTER
1. Caller Name: Nell                      Call Back Number: [phone]    2. Message: pt called and stated that she was changed from omeprazole to protonix by Shi, but the protonix is working the same as the omeprazole did.  She ate some pizza and it did not settle well.  Is there another medication that she can try or would you like me to make the patient an appointment with you?  Please advise     3. Patient approves office to leave a detailed voicemail/MyChart message: N\A

## 2019-05-30 NOTE — TELEPHONE ENCOUNTER
If not doing well with either the omeprazole or the pantoprazole, she needs to see her GI physican.  She can use OTC Maalox or Mylanta for breakthru symptoms.  I have placed referral to GI, she needs to contact them.  Dr Flores.

## 2019-06-24 ENCOUNTER — OFFICE VISIT (OUTPATIENT)
Dept: URGENT CARE | Facility: PHYSICIAN GROUP | Age: 64
End: 2019-06-24
Payer: OTHER MISCELLANEOUS

## 2019-06-24 ENCOUNTER — HOSPITAL ENCOUNTER (OUTPATIENT)
Facility: MEDICAL CENTER | Age: 64
End: 2019-06-24
Attending: NURSE PRACTITIONER
Payer: OTHER MISCELLANEOUS

## 2019-06-24 VITALS
WEIGHT: 177 LBS | RESPIRATION RATE: 16 BRPM | HEIGHT: 65 IN | BODY MASS INDEX: 29.49 KG/M2 | TEMPERATURE: 97.8 F | DIASTOLIC BLOOD PRESSURE: 74 MMHG | SYSTOLIC BLOOD PRESSURE: 134 MMHG | HEART RATE: 64 BPM | OXYGEN SATURATION: 97 %

## 2019-06-24 DIAGNOSIS — N30.90 CYSTITIS: ICD-10-CM

## 2019-06-24 DIAGNOSIS — N30.00 ACUTE CYSTITIS WITHOUT HEMATURIA: ICD-10-CM

## 2019-06-24 LAB
APPEARANCE UR: NORMAL
BILIRUB UR STRIP-MCNC: NORMAL MG/DL
COLOR UR AUTO: NORMAL
FORWARD REASON: SPWHY: NORMAL
FORWARDED TO LAB: SPWHR: NORMAL
GLUCOSE UR STRIP.AUTO-MCNC: NORMAL MG/DL
KETONES UR STRIP.AUTO-MCNC: NORMAL MG/DL
LEUKOCYTE ESTERASE UR QL STRIP.AUTO: NORMAL
NITRITE UR QL STRIP.AUTO: NORMAL
PH UR STRIP.AUTO: 5.5 [PH] (ref 5–8)
PROT UR QL STRIP: NORMAL MG/DL
RBC UR QL AUTO: NORMAL
SP GR UR STRIP.AUTO: 1
SPECIMEN SENT: SPWT1: NORMAL
UROBILINOGEN UR STRIP-MCNC: NORMAL MG/DL

## 2019-06-24 PROCEDURE — 81002 URINALYSIS NONAUTO W/O SCOPE: CPT | Performed by: NURSE PRACTITIONER

## 2019-06-24 PROCEDURE — 99214 OFFICE O/P EST MOD 30 MIN: CPT | Performed by: NURSE PRACTITIONER

## 2019-06-24 RX ORDER — PHENAZOPYRIDINE HYDROCHLORIDE 200 MG/1
200 TABLET, FILM COATED ORAL 3 TIMES DAILY PRN
Qty: 10 TAB | Refills: 0 | Status: SHIPPED | OUTPATIENT
Start: 2019-06-24 | End: 2019-06-27

## 2019-06-24 RX ORDER — NITROFURANTOIN 25; 75 MG/1; MG/1
100 CAPSULE ORAL 2 TIMES DAILY
Qty: 10 CAP | Refills: 0 | Status: SHIPPED | OUTPATIENT
Start: 2019-06-24 | End: 2019-06-27

## 2019-06-24 ASSESSMENT — ENCOUNTER SYMPTOMS
CHILLS: 0
FLANK PAIN: 0
FEVER: 0
NAUSEA: 0
VOMITING: 0

## 2019-06-24 NOTE — PROGRESS NOTES
Subjective:      Nell Fair is a 63 y.o. female who presents with UTI    Past Medical History:   Diagnosis Date   • Anxiety 12/13/2016   • Clotting disorder (HCC) 2/23/2016   • GERD (gastroesophageal reflux disease) 2/5/2013   • H/O radioactive iodine thyroid ablation 2/5/2013   • Obesity 11/20/2015   • Radiation     tx- thyroid   • Vitamin D deficiency 11/20/2015     Social History     Social History   • Marital status:      Spouse name: N/A   • Number of children: N/A   • Years of education: N/A     Occupational History   • Not on file.     Social History Main Topics   • Smoking status: Never Smoker   • Smokeless tobacco: Never Used   • Alcohol use 4.2 oz/week     7 Standard drinks or equivalent per week      Comment: soc   • Drug use: No   • Sexual activity: Yes     Partners: Male     Other Topics Concern   • Not on file     Social History Narrative   • No narrative on file     Ms. Fair had no medications administered during this visit.    Allergies: Ceclor [cefaclor]; Pcn [penicillins]; and Sulfa drugs    C/o dysuria, urgency and frequency starting yesterday. Took 1 dose of pyridium from and old RX and reports some relief with this.  Denies flank pain, fever, n/v.           Dysuria    This is a new problem. The current episode started in the past 7 days. The problem occurs every urination. The problem has been unchanged. The quality of the pain is described as burning and aching. Associated symptoms include frequency and urgency. Pertinent negatives include no chills, flank pain, hematuria, nausea or vomiting. She has tried nothing for the symptoms. The treatment provided no relief.       Review of Systems   Constitutional: Positive for malaise/fatigue. Negative for chills and fever.   Gastrointestinal: Negative for nausea and vomiting.   Genitourinary: Positive for dysuria, frequency and urgency. Negative for flank pain and hematuria.   All other systems reviewed and are negative.          "Objective:     /74 (BP Location: Right arm, Patient Position: Sitting, BP Cuff Size: Adult)   Pulse 64   Temp 36.6 °C (97.8 °F) (Temporal)   Resp 16   Ht 1.651 m (5' 5\")   Wt 80.3 kg (177 lb)   SpO2 97%   BMI 29.45 kg/m²      Physical Exam   Constitutional: She is oriented to person, place, and time. She appears well-developed and well-nourished.   Cardiovascular: Normal rate and regular rhythm.    Pulmonary/Chest: Effort normal and breath sounds normal.   Abdominal: Soft. She exhibits no distension. There is tenderness. There is no guarding.   Positive suprapubic tendernness  No CVAT   Musculoskeletal: Normal range of motion.   Neurological: She is alert and oriented to person, place, and time.   Skin: Skin is warm and dry.   Psychiatric: She has a normal mood and affect. Her behavior is normal. Judgment and thought content normal.   Vitals reviewed.    UA: positive leukocytes, negative nitrates, negative blood          Assessment/Plan:     1. Cystitis    - POCT Urinalysis  - URINE CULTURE(NEW); Future  -macrobid  -push fluids  -ER precautions: fever >100.5, n/v, fever, flu like symjptoms     2. Acute cystitis without hematuria    - POCT Urinalysis  - URINE CULTURE(NEW); Future      "

## 2019-06-26 ENCOUNTER — TELEPHONE (OUTPATIENT)
Dept: URGENT CARE | Facility: PHYSICIAN GROUP | Age: 64
End: 2019-06-26

## 2019-06-27 ENCOUNTER — OFFICE VISIT (OUTPATIENT)
Dept: URGENT CARE | Facility: PHYSICIAN GROUP | Age: 64
End: 2019-06-27
Payer: OTHER MISCELLANEOUS

## 2019-06-27 ENCOUNTER — TELEPHONE (OUTPATIENT)
Dept: URGENT CARE | Facility: CLINIC | Age: 64
End: 2019-06-27

## 2019-06-27 VITALS
HEART RATE: 76 BPM | TEMPERATURE: 98.5 F | DIASTOLIC BLOOD PRESSURE: 74 MMHG | WEIGHT: 177 LBS | SYSTOLIC BLOOD PRESSURE: 132 MMHG | HEIGHT: 65 IN | OXYGEN SATURATION: 97 % | BODY MASS INDEX: 29.49 KG/M2 | RESPIRATION RATE: 16 BRPM

## 2019-06-27 DIAGNOSIS — R30.0 DYSURIA: ICD-10-CM

## 2019-06-27 PROCEDURE — 99214 OFFICE O/P EST MOD 30 MIN: CPT | Performed by: FAMILY MEDICINE

## 2019-06-27 RX ORDER — CIPROFLOXACIN 500 MG/1
500 TABLET, FILM COATED ORAL EVERY 12 HOURS
Qty: 10 TAB | Refills: 0 | Status: SHIPPED | OUTPATIENT
Start: 2019-06-27 | End: 2019-07-02

## 2019-06-27 NOTE — TELEPHONE ENCOUNTER
Attempted to return patient's call.  No answer.  Left detailed instructions for patient to follow up in office if her symptoms are still present.  I am attempting to locate the results of the patient's urine culture, however, the results have not been received yet as the specimen was forwarded to [facility]. I have requested the office in [place] to contact [facility] for the results and have requested to be notified when they are scanned into the chart. I have advised patient as such via voice mail and reiterate that she was advised via voicemail to return to office for persistent symptoms.

## 2019-06-27 NOTE — PROGRESS NOTES
Subjective:      Nell Fair is a 63 y.o. female who presents with UTI      - This is a pleasant and non toxic appearing 63 y.o. female with c/o freq/burn x 1 wk. macrobid not helping. No NVFC. U/C pending           ALLERGIES:  Ceclor [cefaclor]; Pcn [penicillins]; and Sulfa drugs     PMH:  Past Medical History:   Diagnosis Date   • Anxiety 12/13/2016   • Clotting disorder (HCC) 2/23/2016   • GERD (gastroesophageal reflux disease) 2/5/2013   • H/O radioactive iodine thyroid ablation 2/5/2013   • Obesity 11/20/2015   • Radiation     tx- thyroid   • Vitamin D deficiency 11/20/2015        PSH:  Past Surgical History:   Procedure Laterality Date   • ABDOMINAL HYSTERECTOMY TOTAL      BSO       MEDS:    Current Outpatient Prescriptions:   •  ciprofloxacin (CIPRO) 500 MG Tab, Take 1 Tab by mouth every 12 hours for 5 days., Disp: 10 Tab, Rfl: 0  •  pantoprazole (PROTONIX) 40 MG Tablet Delayed Response, Take 1 Tab by mouth every day., Disp: 30 Tab, Rfl: 0  •  omeprazole (PRILOSEC) 40 MG delayed-release capsule, Take 1 Cap by mouth every day. (Patient not taking: Reported on 6/24/2019), Disp: 90 Cap, Rfl: 1  •  B Complex Vitamins (VITAMIN-B COMPLEX PO), Take  by mouth., Disp: , Rfl:   •  Calcium Carbonate-Vitamin D (CALCIUM + D PO), Take  by mouth., Disp: , Rfl:   •  Nutritional Supplements (JUICE PLUS FIBRE PO), Take  by mouth., Disp: , Rfl:   •  vitamin D, Ergocalciferol, (DRISDOL) 13318 UNITS Cap capsule, Take 4,000 Units by mouth every 7 days., Disp: , Rfl:   •  estradiol (VAGIFEM) 10 MCG Tab, Insert 10 mcg in vagina every day., Disp: , Rfl:   •  aspirin 81 MG tablet, Take 81 mg by mouth every day., Disp: , Rfl:     ** I have documented what I find to be significant in regards to past medical, social, family and surgical history  in my HPI or under PMH/PSH/FH review section, otherwise it is contributory **         HPI    Review of Systems   Genitourinary: Positive for dysuria and frequency.   All other systems  "reviewed and are negative.         Objective:     /74   Pulse 76   Temp 36.9 °C (98.5 °F)   Resp 16   Ht 1.651 m (5' 5\")   Wt 80.3 kg (177 lb)   SpO2 97%   BMI 29.45 kg/m²      Physical Exam   Constitutional: She appears well-developed. No distress.   HENT:   Head: Normocephalic and atraumatic.   Cardiovascular: Regular rhythm.    No murmur heard.  Pulmonary/Chest: Effort normal. No respiratory distress.   Abdominal: Soft. There is no tenderness.   Neurological: She is alert. She exhibits normal muscle tone.   Skin: Skin is warm and dry.   Psychiatric: She has a normal mood and affect. Judgment normal.   Nursing note and vitals reviewed.              Assessment/Plan:         1. Dysuria  ciprofloxacin (CIPRO) 500 MG Tab             Dx & d/c instructions discussed w/ patient and/or family members.     Follow up with PCP (or here if PCP unavailable) in 2-3 days if symptoms not improving, ER if feeling/getting worse.    Any realistic and/or common medication side effects that may have been given today(i.e. Rash, GI upset/constipation, sedation, elevation of BP or blood sugars) reviewed.     Patient left in stable condition              "

## 2019-06-28 ENCOUNTER — TELEPHONE (OUTPATIENT)
Dept: URGENT CARE | Facility: MEDICAL CENTER | Age: 64
End: 2019-06-28

## 2019-06-28 NOTE — TELEPHONE ENCOUNTER
Patient notified by phone of urine culture results.  Results were positive for E. coli, pansensitive.  However, patient was not responding after 2 days on Macrobid and went back to urgent care yesterday and her prescription was changed to Cipro.  Results of the culture were not available until today, and patient does report that after 2 doses of Cipro she has clinically improved significantly.  I advised patient to continue the Cipro.  No further questions at this time, call or return to urgent care for any further questions or concerns.

## 2019-10-09 ENCOUNTER — OFFICE VISIT (OUTPATIENT)
Dept: MEDICAL GROUP | Facility: PHYSICIAN GROUP | Age: 64
End: 2019-10-09
Payer: OTHER MISCELLANEOUS

## 2019-10-09 VITALS
BODY MASS INDEX: 30.16 KG/M2 | DIASTOLIC BLOOD PRESSURE: 80 MMHG | TEMPERATURE: 98.4 F | RESPIRATION RATE: 14 BRPM | OXYGEN SATURATION: 96 % | HEART RATE: 85 BPM | HEIGHT: 65 IN | WEIGHT: 181 LBS | SYSTOLIC BLOOD PRESSURE: 158 MMHG

## 2019-10-09 DIAGNOSIS — Z23 NEEDS FLU SHOT: ICD-10-CM

## 2019-10-09 DIAGNOSIS — N28.9 RENAL INSUFFICIENCY: ICD-10-CM

## 2019-10-09 DIAGNOSIS — D68.9 CLOTTING DISORDER (HCC): ICD-10-CM

## 2019-10-09 DIAGNOSIS — K21.00 GASTROESOPHAGEAL REFLUX DISEASE WITH ESOPHAGITIS: Chronic | ICD-10-CM

## 2019-10-09 DIAGNOSIS — E78.5 DYSLIPIDEMIA: ICD-10-CM

## 2019-10-09 DIAGNOSIS — E55.9 VITAMIN D DEFICIENCY: ICD-10-CM

## 2019-10-09 PROCEDURE — 90471 IMMUNIZATION ADMIN: CPT | Performed by: INTERNAL MEDICINE

## 2019-10-09 PROCEDURE — 90686 IIV4 VACC NO PRSV 0.5 ML IM: CPT | Performed by: INTERNAL MEDICINE

## 2019-10-09 PROCEDURE — 99214 OFFICE O/P EST MOD 30 MIN: CPT | Mod: 25 | Performed by: INTERNAL MEDICINE

## 2019-10-09 NOTE — ASSESSMENT & PLAN NOTE
Patient reports that she hs not seen GI, she has continued the pantoprazole and she reports symptoms resolved.  She ran out of the pantoprazole, has used some of the omeprazole and it works but not as well.  She has raised the head of her bed.

## 2019-10-09 NOTE — PATIENT INSTRUCTIONS
Allegra Claritin or zyrtec  If persisting ringing in the ear or left facial pressure we will recommend ENT.    Continue the pantoprazole, if needed maalox or mylanta  If persists return to GI.

## 2019-10-09 NOTE — PROGRESS NOTES
Chief Complaint   Patient presents with   • Labs Only     lab results/ [facility]        HISTORY OF PRESENT ILLNESS: Patient is a 64 y.o. female established patient who presents today to discuss the medical issues below.    GERD (gastroesophageal reflux disease)  Patient reports that she hs not seen GI, she has continued the pantoprazole and she reports symptoms resolved.  She ran out of the pantoprazole, has used some of the omeprazole and it works but not as well.  She has raised the head of her bed.      Dyslipidemia  Patient had lab work done over at the hospital.  LDL is slightly more elevated.  She admits to ongoing difficulties with diet exercise and weight.    Clotting disorder (CMS-HCC)  No complaints of significant bleeding or bruising.  Continue aspirin 81 mg a day.      Patient Active Problem List    Diagnosis Date Noted   • Dyslipidemia 09/27/2018   • Blue nevus of scalp 12/13/2016   • Anxiety 12/13/2016   • Clotting disorder (HCC) 02/23/2016   • Obesity 11/20/2015   • Vitamin D deficiency 11/20/2015   • GERD (gastroesophageal reflux disease) 02/05/2013   • H/O radioactive iodine thyroid ablation 02/05/2013       Allergies:Ceclor [cefaclor]; Pcn [penicillins]; and Sulfa drugs    Current Outpatient Medications   Medication Sig Dispense Refill   • pantoprazole (PROTONIX) 40 MG Tablet Delayed Response TAKE ONE TABLET BY MOUTH EVERY DAY 90 Tab 3   • B Complex Vitamins (VITAMIN-B COMPLEX PO) Take  by mouth.     • Calcium Carbonate-Vitamin D (CALCIUM + D PO) Take  by mouth.     • vitamin D, Ergocalciferol, (DRISDOL) 41467 UNITS Cap capsule Take 4,000 Units by mouth every 7 days.     • estradiol (VAGIFEM) 10 MCG Tab Insert 10 mcg in vagina every day.     • aspirin 81 MG tablet Take 81 mg by mouth every day.     • Nutritional Supplements (JUICE PLUS FIBRE PO) Take  by mouth.       No current facility-administered medications for this visit.          Past Medical History:   Diagnosis Date   • Anxiety 12/13/2016  "  • Clotting disorder (HCC) 2/23/2016   • GERD (gastroesophageal reflux disease) 2/5/2013   • H/O radioactive iodine thyroid ablation 2/5/2013   • Obesity 11/20/2015   • Radiation     tx- thyroid   • Vitamin D deficiency 11/20/2015       Social History     Tobacco Use   • Smoking status: Never Smoker   • Smokeless tobacco: Never Used   Substance Use Topics   • Alcohol use: Yes     Alcohol/week: 4.2 oz     Types: 7 Standard drinks or equivalent per week     Comment: soc   • Drug use: No       Family Status   Relation Name Status   • Mo  Alive   • Fa  Alive   • Bro  Alive   • Barry  Alive     Family History   Problem Relation Age of Onset   • Diabetes Mother    • Other Mother         macular degeneration   • Thyroid Mother    • Psychiatric Illness Mother         mood disorder   • Heart Disease Mother         pacemaker   • Osteoporosis Mother    • Hyperlipidemia Father    • Heart Disease Father         pacemaker   • Cancer Father         prostate   • Hyperlipidemia Brother    • Cancer Brother         prostate       ROS:    Respiratory: Negative for cough, sputum production, shortness of breath or wheezing.    Cardiovascular: Negative for chest pain, palpitations, orthopnea, dyspnea with exertion or edema.   Gastrointestinal: Negative for GI upset, nausea, vomiting, abdominal pain, constipation or diarrhea.   Genitourinary: Negative for dysuria, urgency, hesitancy or frequency.       Exam:    /80 (BP Location: Left arm, Patient Position: Sitting, BP Cuff Size: Small adult)   Pulse 85   Temp 36.9 °C (98.4 °F) (Temporal)   Resp 14   Ht 1.651 m (5' 5\")   Wt 82.1 kg (181 lb)   SpO2 96%   General:  Well nourished, well developed female in NAD.  Pulmonary: Clear to ausculation and percussion.  Normal effort. No rales, rhonchi, or wheezing.  Cardiovascular: Regular rate and rhythm without murmur.   Abdomen: Normal bowel sounds soft and nontender no palpable liver spleen bladder mass.  Extremities: No LE edema " noted.  Neuro: Grossly nonfocal.  Psych: Alert and oriented to person, place, and time. Appropriate mood and conversation.    LABS: Results reviewed and discussed with the patient, questions answered.      This dictation was created using voice recognition software. I have made reasonable attempts to correct errors, however, errors of grammar and content may exist.          Assessment/Plan:    1. Gastroesophageal reflux disease with esophagitis  Discussed implications of reflux esophagitis.  She is clinically silent currently on pantoprazole 40 mg daily.  Ongoing monitoring versus early follow-up with GI discussed.  She opts for conservative management follow-up with GI PRN recurrence of symptomatology.  PRN Maalox plus Mylanta to reviewed with the patient    2. Needs flu shot    - Influenza Vaccine Quad Injection (PF)    3. Vitamin D deficiency  Vitamin D level was not done at the [facility] labs.  Will assess at next draw  - VITAMIN D,25 HYDROXY; Future    4. Renal insufficiency  Mild renal insufficiency reviewed with the patient once again.  Monitor with increased p.o. intake, no evidence of nephrotoxins.    - Comp Metabolic Panel; Future    5. Dyslipidemia  Significantly elevated LDL discussed implications she is going to work a little bit more on diet exercise    6. Clotting disorder (HCC)  At baseline no change.      Patient was seen for 25 minutes face to face of which more than 50% of the time was spent in counseling and coordination of care regarding the above problems.

## 2019-10-09 NOTE — ASSESSMENT & PLAN NOTE
Patient had lab work done over at the hospital.  LDL is slightly more elevated.  She admits to ongoing difficulties with diet exercise and weight.

## 2020-07-01 ENCOUNTER — OFFICE VISIT (OUTPATIENT)
Dept: MEDICAL GROUP | Facility: PHYSICIAN GROUP | Age: 65
End: 2020-07-01
Payer: MEDICARE

## 2020-07-01 DIAGNOSIS — F41.9 ANXIETY: ICD-10-CM

## 2020-07-06 NOTE — ASSESSMENT & PLAN NOTE
Patient presents for office visit unfortunately, computer system is down.  Access to data and information limited.  Patient denying any ongoing significant problems at office visit.

## 2020-07-06 NOTE — PROGRESS NOTES
Chief Complaint   Patient presents with   • Lab Results     [facility]       HISTORY OF PRESENT ILLNESS: Patient is a 64 y.o. female established patient who presents today to discuss the medical issues below.    Anxiety  Patient presents for office visit unfortunately, computer system is down.  Access to data and information limited.  Patient denying any ongoing significant problems at office visit.      Patient Active Problem List    Diagnosis Date Noted   • Dyslipidemia 09/27/2018   • Blue nevus of scalp 12/13/2016   • Anxiety 12/13/2016   • Clotting disorder (HCC) 02/23/2016   • Obesity 11/20/2015   • Vitamin D deficiency 11/20/2015   • GERD (gastroesophageal reflux disease) 02/05/2013   • H/O radioactive iodine thyroid ablation 02/05/2013       Allergies:Ceclor [cefaclor]; Pcn [penicillins]; and Sulfa drugs    Current Outpatient Medications   Medication Sig Dispense Refill   • pantoprazole (PROTONIX) 40 MG Tablet Delayed Response TAKE ONE TABLET BY MOUTH EVERY DAY 90 Tab 3   • B Complex Vitamins (VITAMIN-B COMPLEX PO) Take  by mouth.     • Calcium Carbonate-Vitamin D (CALCIUM + D PO) Take  by mouth.     • Nutritional Supplements (JUICE PLUS FIBRE PO) Take  by mouth.     • vitamin D, Ergocalciferol, (DRISDOL) 74406 UNITS Cap capsule Take 4,000 Units by mouth every 7 days.     • estradiol (VAGIFEM) 10 MCG Tab Insert 10 mcg in vagina every day.     • aspirin 81 MG tablet Take 81 mg by mouth every day.       No current facility-administered medications for this visit.          Past Medical History:   Diagnosis Date   • Anxiety 12/13/2016   • Clotting disorder (HCC) 2/23/2016   • GERD (gastroesophageal reflux disease) 2/5/2013   • H/O radioactive iodine thyroid ablation 2/5/2013   • Obesity 11/20/2015   • Radiation     tx- thyroid   • Vitamin D deficiency 11/20/2015       Social History     Tobacco Use   • Smoking status: Never Smoker   • Smokeless tobacco: Never Used   Substance Use Topics   • Alcohol use: Yes      Alcohol/week: 4.2 oz     Types: 7 Standard drinks or equivalent per week     Comment: soc   • Drug use: No       Family Status   Relation Name Status   • Mo  Alive   • Fa  Alive   • Bro  Alive   • Barry  Alive     Family History   Problem Relation Age of Onset   • Diabetes Mother    • Other Mother         macular degeneration   • Thyroid Mother    • Psychiatric Illness Mother         mood disorder   • Heart Disease Mother         pacemaker   • Osteoporosis Mother    • Hyperlipidemia Father    • Heart Disease Father         pacemaker   • Cancer Father         prostate   • Hyperlipidemia Brother    • Cancer Brother         prostate       ROS:    Respiratory: Negative for cough, sputum production, shortness of breath or wheezing.    Cardiovascular: Negative for chest pain, palpitations, orthopnea, dyspnea with exertion or edema.   Gastrointestinal: Negative for GI upset, nausea, vomiting, abdominal pain, constipation or diarrhea.   Genitourinary: Negative for dysuria, urgency, hesitancy or frequency.       Exam:    There were no vitals taken for this visit. There is no height or weight on file to calculate BMI.  General:  Well nourished, well developed female in NAD.  Psych: Alert and oriented to person, place, and time. Appropriate mood and conversation.        This dictation was created using voice recognition software. I have made reasonable attempts to correct errors, however, errors of grammar and content may exist.          Assessment/Plan:    1. Anxiety  Reschedule office visit support given.  No acute active issues.

## 2020-07-08 RX ORDER — PANTOPRAZOLE SODIUM 40 MG/1
40 TABLET, DELAYED RELEASE ORAL
Qty: 90 TAB | Refills: 3 | Status: SHIPPED | OUTPATIENT
Start: 2020-07-08